# Patient Record
Sex: MALE | Race: WHITE | NOT HISPANIC OR LATINO | Employment: OTHER | URBAN - NONMETROPOLITAN AREA
[De-identification: names, ages, dates, MRNs, and addresses within clinical notes are randomized per-mention and may not be internally consistent; named-entity substitution may affect disease eponyms.]

---

## 2023-11-08 ENCOUNTER — ANCILLARY PROCEDURE (OUTPATIENT)
Dept: RADIOLOGY | Facility: CLINIC | Age: 58
End: 2023-11-08
Payer: COMMERCIAL

## 2023-11-08 ENCOUNTER — OFFICE VISIT (OUTPATIENT)
Dept: UROLOGY | Facility: CLINIC | Age: 58
End: 2023-11-08
Payer: COMMERCIAL

## 2023-11-08 VITALS — RESPIRATION RATE: 16 BRPM | HEIGHT: 74 IN | WEIGHT: 225 LBS | BODY MASS INDEX: 28.88 KG/M2

## 2023-11-08 DIAGNOSIS — R35.1 NOCTURIA: ICD-10-CM

## 2023-11-08 DIAGNOSIS — N20.0 KIDNEY STONES: ICD-10-CM

## 2023-11-08 DIAGNOSIS — N28.1 RENAL CYST: ICD-10-CM

## 2023-11-08 PROCEDURE — 99204 OFFICE O/P NEW MOD 45 MIN: CPT | Performed by: UROLOGY

## 2023-11-08 PROCEDURE — 74018 RADEX ABDOMEN 1 VIEW: CPT | Performed by: RADIOLOGY

## 2023-11-08 PROCEDURE — 74018 RADEX ABDOMEN 1 VIEW: CPT | Mod: FY

## 2023-11-08 PROCEDURE — 1036F TOBACCO NON-USER: CPT | Performed by: UROLOGY

## 2023-11-08 RX ORDER — GLIPIZIDE 2.5 MG/1
2.5 TABLET, EXTENDED RELEASE ORAL
COMMUNITY
End: 2024-01-21 | Stop reason: HOSPADM

## 2023-11-08 RX ORDER — PROPRANOLOL HYDROCHLORIDE 10 MG/1
10 TABLET ORAL 2 TIMES DAILY
COMMUNITY
Start: 2019-10-08

## 2023-11-08 RX ORDER — PANTOPRAZOLE SODIUM 40 MG/1
40 TABLET, DELAYED RELEASE ORAL 2 TIMES DAILY
COMMUNITY

## 2023-11-08 RX ORDER — ATORVASTATIN CALCIUM 20 MG/1
20 TABLET, FILM COATED ORAL DAILY
COMMUNITY
Start: 2019-10-15

## 2023-11-08 RX ORDER — TALC
250 POWDER (GRAM) TOPICAL DAILY
COMMUNITY
Start: 2023-11-04

## 2023-11-08 RX ORDER — METFORMIN HYDROCHLORIDE 500 MG/1
1000 TABLET, EXTENDED RELEASE ORAL
COMMUNITY

## 2023-11-08 ASSESSMENT — ENCOUNTER SYMPTOMS
ENDOCRINE NEGATIVE: 1
CHILLS: 0
NAUSEA: 0
EYES NEGATIVE: 1
SHORTNESS OF BREATH: 0
COUGH: 0
ALLERGIC/IMMUNOLOGIC NEGATIVE: 1
PSYCHIATRIC NEGATIVE: 1
DIFFICULTY URINATING: 0
FEVER: 0

## 2023-11-08 NOTE — PROGRESS NOTES
Subjective   Patient ID: Tj Miranda is a 58 y.o. male.    HPI  Patient is here to establish for kidney stone. Recent abdominal U/S showed multiple left nonobstructive stones and simple left renal cyst. Also showed echogenic mass at the right inferior pole kidney likekly angiomyolipoma or renal lipoma.  He does have dull intermittent left flank pain. Chronic BPH sx are mild and stable. Denies urgency and frequency. Denies dysuria. Denies hematuria. Nocturia x1. No medication for LUT'S. No recent PSA has been done.         Review of Systems   Constitutional:  Negative for chills and fever.   HENT: Negative.     Eyes: Negative.    Respiratory:  Negative for cough and shortness of breath.    Cardiovascular:  Negative for chest pain and leg swelling.   Gastrointestinal:  Negative for nausea.   Endocrine: Negative.    Genitourinary:  Negative for difficulty urinating.        Negative except for documented in HPI   Allergic/Immunologic: Negative.    Neurological:         Alert & oriented X 3   Hematological:         Denies blood thinners   Psychiatric/Behavioral: Negative.         Objective   Physical Exam  Vitals and nursing note reviewed.   Constitutional:       General: He is not in acute distress.     Appearance: Normal appearance.   Pulmonary:      Effort: Pulmonary effort is normal.   Abdominal:      Tenderness: There is no abdominal tenderness.   Genitourinary:     Comments: Kidneys non palpable bilaterally  Bladder non palpable or tender  Scrotum no mass, No hydrocele  Epididymis- No spermatocele. Non Tender.  Testicles: No mass. WNL  Urethra: No discharge  Penis within normal limits... No lesions. No phimosis  Prostate - symmetric, no nodules. BENIGN  Seminal Vesicals: No mass.  Sphincter tone: normal  Neurological:      Mental Status: He is alert.         Assessment/Plan   Diagnoses and all orders for this visit:  Kidney stones  Nocturia  Renal cyst    All available PSA values reviewed, Options discussed.  Questions answered.  PSA ordered   Diet changes for prostate health discussed and educational information given. Pros/Cons of prostate health supplements discussed.   Treatment options for LUTS reviewed  Discussed timed voiding. Discussed fluid and caffeine intake  Treatment options for ED reviewed.  Lifestyle change to help prevent UTIs discussed. Encouraged fluid intake.  U/S reviewed  CT with and Witout IV contrast ordered  BMP ordered  Stone prevention discussed. Diet reviewed. Discussed fluid intake  KUB ordered      F/U with CT and PSA and BMP

## 2023-11-17 ENCOUNTER — LAB (OUTPATIENT)
Dept: LAB | Facility: LAB | Age: 58
End: 2023-11-17
Payer: COMMERCIAL

## 2023-11-17 DIAGNOSIS — N20.0 KIDNEY STONES: ICD-10-CM

## 2023-11-17 DIAGNOSIS — R35.1 NOCTURIA: ICD-10-CM

## 2023-11-17 LAB
CREAT SERPL-MCNC: 0.71 MG/DL (ref 0.5–1.3)
GFR SERPL CREATININE-BSD FRML MDRD: >90 ML/MIN/1.73M*2
PSA SERPL-MCNC: 0.52 NG/ML

## 2023-11-17 PROCEDURE — 84153 ASSAY OF PSA TOTAL: CPT

## 2023-11-17 PROCEDURE — 36415 COLL VENOUS BLD VENIPUNCTURE: CPT

## 2023-11-17 PROCEDURE — 82565 ASSAY OF CREATININE: CPT

## 2023-11-28 ENCOUNTER — HOSPITAL ENCOUNTER (OUTPATIENT)
Dept: RADIOLOGY | Facility: HOSPITAL | Age: 58
Discharge: HOME | End: 2023-11-28
Payer: COMMERCIAL

## 2023-11-28 DIAGNOSIS — N20.0 KIDNEY STONES: ICD-10-CM

## 2023-11-28 PROCEDURE — A9698 NON-RAD CONTRAST MATERIALNOC: HCPCS | Performed by: UROLOGY

## 2023-11-28 PROCEDURE — 74177 CT ABD & PELVIS W/CONTRAST: CPT | Performed by: RADIOLOGY

## 2023-11-28 PROCEDURE — 2550000001 HC RX 255 CONTRASTS: Performed by: UROLOGY

## 2023-11-28 PROCEDURE — 74177 CT ABD & PELVIS W/CONTRAST: CPT

## 2023-11-28 RX ADMIN — IOHEXOL 72 ML: 350 INJECTION, SOLUTION INTRAVENOUS at 15:17

## 2023-11-28 RX ADMIN — IOHEXOL 500 ML: 12 SOLUTION ORAL at 15:18

## 2023-12-04 ASSESSMENT — ENCOUNTER SYMPTOMS
FEVER: 0
CHILLS: 0
ENDOCRINE NEGATIVE: 1
PSYCHIATRIC NEGATIVE: 1
NAUSEA: 0
EYES NEGATIVE: 1
DIFFICULTY URINATING: 0
COUGH: 0
SHORTNESS OF BREATH: 0
ALLERGIC/IMMUNOLOGIC NEGATIVE: 1

## 2023-12-04 NOTE — PROGRESS NOTES
Subjective   Patient ID: Tj Miranda is a 58 y.o. male.    HPI  Patient is here for CT results. CT showed 2.2cm stone Recent abdominal U/S showed multiple left nonobstructive stones and simple left renal cyst. Also showed echogenic mass at the right inferior pole kidney likekly angiomyolipoma or renal lipoma.  He does have dull intermittent left flank pain. Chronic BPH sx are mild and stable. Denies urgency and frequency. Denies dysuria. Denies hematuria. Nocturia x1. No medication for LUT'S. PSA 0.52 (11/23)     Review of Systems   Constitutional:  Negative for chills and fever.   HENT: Negative.     Eyes: Negative.    Respiratory:  Negative for cough and shortness of breath.    Cardiovascular:  Negative for chest pain and leg swelling.   Gastrointestinal:  Negative for nausea.   Endocrine: Negative.    Genitourinary:  Negative for difficulty urinating.        Negative except for documented in HPI   Allergic/Immunologic: Negative.    Neurological:         Alert & oriented X 3   Hematological:         Denies blood thinners   Psychiatric/Behavioral: Negative.         Objective   Physical Exam  Virtual Visit  Assessment/Plan   Diagnoses and all orders for this visit:  Kidney stones  Renal cyst  Nocturia    KUB and CT reviewed  Discussed options for Tx of 2cm renal stone  Pros/cons of PCNL discussed  Also discussed flexible ureteroscopy with laser and ESWL  Stone prevention discussed. Diet reviewed. Discussed fluid intake  All available PSA values reviewed, Options discussed. Questions answered.   Diet changes for prostate health discussed and educational information given.  Pros/Cons of prostate health supplements discussed.     F/U L ESWL WITH FLEXIBLE URETEROSCOPY WITH LASER AND STENT

## 2023-12-06 ENCOUNTER — TELEMEDICINE (OUTPATIENT)
Dept: UROLOGY | Facility: CLINIC | Age: 58
End: 2023-12-06
Payer: COMMERCIAL

## 2023-12-06 DIAGNOSIS — N20.0 KIDNEY STONES: ICD-10-CM

## 2023-12-06 DIAGNOSIS — R35.1 NOCTURIA: ICD-10-CM

## 2023-12-06 DIAGNOSIS — N28.1 RENAL CYST: ICD-10-CM

## 2023-12-06 PROCEDURE — 99214 OFFICE O/P EST MOD 30 MIN: CPT | Performed by: UROLOGY

## 2023-12-11 ENCOUNTER — PREP FOR PROCEDURE (OUTPATIENT)
Dept: UROLOGY | Facility: CLINIC | Age: 58
End: 2023-12-11
Payer: COMMERCIAL

## 2023-12-11 ENCOUNTER — ANESTHESIA EVENT (OUTPATIENT)
Dept: OPERATING ROOM | Facility: HOSPITAL | Age: 58
End: 2023-12-11
Payer: COMMERCIAL

## 2023-12-11 DIAGNOSIS — N20.1 CALCULUS OF URETER: ICD-10-CM

## 2023-12-11 RX ORDER — ONDANSETRON HYDROCHLORIDE 2 MG/ML
4 INJECTION, SOLUTION INTRAVENOUS ONCE AS NEEDED
Status: CANCELLED | OUTPATIENT
Start: 2023-12-11

## 2023-12-11 RX ORDER — HYDROMORPHONE HYDROCHLORIDE 2 MG/ML
0.5 INJECTION, SOLUTION INTRAMUSCULAR; INTRAVENOUS; SUBCUTANEOUS EVERY 5 MIN PRN
Status: CANCELLED | OUTPATIENT
Start: 2023-12-11

## 2023-12-11 RX ORDER — OXYCODONE HYDROCHLORIDE 5 MG/1
5 TABLET ORAL EVERY 4 HOURS PRN
Status: CANCELLED | OUTPATIENT
Start: 2023-12-11

## 2023-12-11 RX ORDER — SODIUM CHLORIDE, SODIUM LACTATE, POTASSIUM CHLORIDE, CALCIUM CHLORIDE 600; 310; 30; 20 MG/100ML; MG/100ML; MG/100ML; MG/100ML
100 INJECTION, SOLUTION INTRAVENOUS CONTINUOUS
Status: CANCELLED | OUTPATIENT
Start: 2023-12-11

## 2023-12-12 ENCOUNTER — HOSPITAL ENCOUNTER (OUTPATIENT)
Facility: HOSPITAL | Age: 58
Setting detail: OUTPATIENT SURGERY
Discharge: HOME | End: 2023-12-12
Attending: UROLOGY | Admitting: UROLOGY
Payer: COMMERCIAL

## 2023-12-12 ENCOUNTER — ANESTHESIA (OUTPATIENT)
Dept: OPERATING ROOM | Facility: HOSPITAL | Age: 58
End: 2023-12-12
Payer: COMMERCIAL

## 2023-12-12 ENCOUNTER — PHARMACY VISIT (OUTPATIENT)
Dept: PHARMACY | Facility: CLINIC | Age: 58
End: 2023-12-12
Payer: COMMERCIAL

## 2023-12-12 VITALS
OXYGEN SATURATION: 98 % | SYSTOLIC BLOOD PRESSURE: 134 MMHG | HEIGHT: 74 IN | TEMPERATURE: 98.2 F | BODY MASS INDEX: 28.75 KG/M2 | HEART RATE: 76 BPM | DIASTOLIC BLOOD PRESSURE: 83 MMHG | RESPIRATION RATE: 16 BRPM | WEIGHT: 223.99 LBS

## 2023-12-12 DIAGNOSIS — N20.1 CALCULUS OF URETER: Primary | ICD-10-CM

## 2023-12-12 LAB — GLUCOSE BLD MANUAL STRIP-MCNC: 160 MG/DL (ref 74–99)

## 2023-12-12 PROCEDURE — 52353 CYSTOURETERO W/LITHOTRIPSY: CPT | Performed by: UROLOGY

## 2023-12-12 PROCEDURE — 3700000001 HC GENERAL ANESTHESIA TIME - INITIAL BASE CHARGE: Performed by: UROLOGY

## 2023-12-12 PROCEDURE — 74420 UROGRAPHY RTRGR +-KUB: CPT | Performed by: UROLOGY

## 2023-12-12 PROCEDURE — 50590 FRAGMENTING OF KIDNEY STONE: CPT | Performed by: UROLOGY

## 2023-12-12 PROCEDURE — RXMED WILLOW AMBULATORY MEDICATION CHARGE

## 2023-12-12 PROCEDURE — 7100000002 HC RECOVERY ROOM TIME - EACH INCREMENTAL 1 MINUTE: Performed by: UROLOGY

## 2023-12-12 PROCEDURE — 7100000010 HC PHASE TWO TIME - EACH INCREMENTAL 1 MINUTE: Performed by: UROLOGY

## 2023-12-12 PROCEDURE — 2720000007 HC OR 272 NO HCPCS: Performed by: UROLOGY

## 2023-12-12 PROCEDURE — 2500000004 HC RX 250 GENERAL PHARMACY W/ HCPCS (ALT 636 FOR OP/ED): Performed by: ANESTHESIOLOGY

## 2023-12-12 PROCEDURE — 7100000009 HC PHASE TWO TIME - INITIAL BASE CHARGE: Performed by: UROLOGY

## 2023-12-12 PROCEDURE — 2550000001 HC RX 255 CONTRASTS: Performed by: UROLOGY

## 2023-12-12 PROCEDURE — 52356 CYSTO/URETERO W/LITHOTRIPSY: CPT | Performed by: UROLOGY

## 2023-12-12 PROCEDURE — 2780000003 HC OR 278 NO HCPCS: Performed by: UROLOGY

## 2023-12-12 PROCEDURE — 7100000001 HC RECOVERY ROOM TIME - INITIAL BASE CHARGE: Performed by: UROLOGY

## 2023-12-12 PROCEDURE — C1769 GUIDE WIRE: HCPCS | Performed by: UROLOGY

## 2023-12-12 PROCEDURE — 2500000005 HC RX 250 GENERAL PHARMACY W/O HCPCS: Performed by: ANESTHESIOLOGY

## 2023-12-12 PROCEDURE — 3700000002 HC GENERAL ANESTHESIA TIME - EACH INCREMENTAL 1 MINUTE: Performed by: UROLOGY

## 2023-12-12 PROCEDURE — 82947 ASSAY GLUCOSE BLOOD QUANT: CPT

## 2023-12-12 DEVICE — URETERAL STENT
Type: IMPLANTABLE DEVICE | Site: URETER | Status: FUNCTIONAL
Brand: POLARIS™ ULTRA

## 2023-12-12 RX ORDER — FAMOTIDINE 10 MG/ML
20 INJECTION INTRAVENOUS ONCE
Status: COMPLETED | OUTPATIENT
Start: 2023-12-12 | End: 2023-12-12

## 2023-12-12 RX ORDER — SODIUM CHLORIDE, SODIUM LACTATE, POTASSIUM CHLORIDE, CALCIUM CHLORIDE 600; 310; 30; 20 MG/100ML; MG/100ML; MG/100ML; MG/100ML
75 INJECTION, SOLUTION INTRAVENOUS CONTINUOUS
Status: DISCONTINUED | OUTPATIENT
Start: 2023-12-12 | End: 2023-12-12 | Stop reason: HOSPADM

## 2023-12-12 RX ORDER — PROPOFOL 10 MG/ML
INJECTION, EMULSION INTRAVENOUS AS NEEDED
Status: DISCONTINUED | OUTPATIENT
Start: 2023-12-12 | End: 2023-12-12

## 2023-12-12 RX ORDER — DEXAMETHASONE SODIUM PHOSPHATE 10 MG/ML
6 INJECTION INTRAMUSCULAR; INTRAVENOUS ONCE
Status: COMPLETED | OUTPATIENT
Start: 2023-12-12 | End: 2023-12-12

## 2023-12-12 RX ORDER — ACETAMINOPHEN 325 MG/1
975 TABLET ORAL ONCE
Status: COMPLETED | OUTPATIENT
Start: 2023-12-12 | End: 2023-12-12

## 2023-12-12 RX ORDER — HYDROCODONE BITARTRATE AND ACETAMINOPHEN 5; 325 MG/1; MG/1
1 TABLET ORAL EVERY 6 HOURS PRN
Qty: 20 TABLET | Refills: 0 | Status: SHIPPED | OUTPATIENT
Start: 2023-12-12 | End: 2024-01-16 | Stop reason: WASHOUT

## 2023-12-12 RX ORDER — CIPROFLOXACIN 250 MG/1
250 TABLET, FILM COATED ORAL 2 TIMES DAILY
Qty: 6 TABLET | Refills: 0 | Status: SHIPPED | OUTPATIENT
Start: 2023-12-12 | End: 2024-01-16 | Stop reason: WASHOUT

## 2023-12-12 RX ORDER — PHENAZOPYRIDINE HYDROCHLORIDE 200 MG/1
200 TABLET, FILM COATED ORAL 3 TIMES DAILY PRN
Qty: 30 TABLET | Refills: 0 | Status: ON HOLD | OUTPATIENT
Start: 2023-12-12 | End: 2024-01-19 | Stop reason: SDUPTHER

## 2023-12-12 RX ORDER — LIDOCAINE HYDROCHLORIDE 10 MG/ML
INJECTION, SOLUTION EPIDURAL; INFILTRATION; INTRACAUDAL; PERINEURAL AS NEEDED
Status: DISCONTINUED | OUTPATIENT
Start: 2023-12-12 | End: 2023-12-12

## 2023-12-12 RX ORDER — FENTANYL CITRATE 50 UG/ML
INJECTION, SOLUTION INTRAMUSCULAR; INTRAVENOUS AS NEEDED
Status: DISCONTINUED | OUTPATIENT
Start: 2023-12-12 | End: 2023-12-12

## 2023-12-12 RX ORDER — NORETHINDRONE AND ETHINYL ESTRADIOL 0.5-0.035
KIT ORAL AS NEEDED
Status: DISCONTINUED | OUTPATIENT
Start: 2023-12-12 | End: 2023-12-12

## 2023-12-12 RX ORDER — MIDAZOLAM HYDROCHLORIDE 1 MG/ML
2 INJECTION INTRAMUSCULAR; INTRAVENOUS ONCE
Status: COMPLETED | OUTPATIENT
Start: 2023-12-12 | End: 2023-12-12

## 2023-12-12 RX ORDER — ONDANSETRON HYDROCHLORIDE 2 MG/ML
4 INJECTION, SOLUTION INTRAVENOUS ONCE
Status: COMPLETED | OUTPATIENT
Start: 2023-12-12 | End: 2023-12-12

## 2023-12-12 RX ADMIN — ONDANSETRON 4 MG: 2 INJECTION INTRAMUSCULAR; INTRAVENOUS at 13:36

## 2023-12-12 RX ADMIN — PROPOFOL 180 MG: 10 INJECTION, EMULSION INTRAVENOUS at 14:51

## 2023-12-12 RX ADMIN — PROPOFOL 20 MG: 10 INJECTION, EMULSION INTRAVENOUS at 15:05

## 2023-12-12 RX ADMIN — MIDAZOLAM HYDROCHLORIDE 2 MG: 1 INJECTION, SOLUTION INTRAMUSCULAR; INTRAVENOUS at 13:35

## 2023-12-12 RX ADMIN — FENTANYL CITRATE 100 MCG: 50 INJECTION, SOLUTION INTRAMUSCULAR; INTRAVENOUS at 14:49

## 2023-12-12 RX ADMIN — SODIUM CHLORIDE, POTASSIUM CHLORIDE, SODIUM LACTATE AND CALCIUM CHLORIDE 75 ML/HR: 600; 310; 30; 20 INJECTION, SOLUTION INTRAVENOUS at 13:34

## 2023-12-12 RX ADMIN — EPHEDRINE SULFATE 15 MG: 50 INJECTION, SOLUTION INTRAVENOUS at 14:57

## 2023-12-12 RX ADMIN — LIDOCAINE HYDROCHLORIDE 30 MG: 10 INJECTION, SOLUTION EPIDURAL; INFILTRATION; INTRACAUDAL; PERINEURAL at 14:51

## 2023-12-12 RX ADMIN — Medication 20 MG: at 14:52

## 2023-12-12 RX ADMIN — FAMOTIDINE 20 MG: 10 INJECTION, SOLUTION INTRAVENOUS at 13:37

## 2023-12-12 RX ADMIN — DEXAMETHASONE SODIUM PHOSPHATE 6 MG: 10 INJECTION, SOLUTION INTRAMUSCULAR; INTRAVENOUS at 13:38

## 2023-12-12 RX ADMIN — ACETAMINOPHEN 975 MG: 325 TABLET ORAL at 13:34

## 2023-12-12 SDOH — HEALTH STABILITY: MENTAL HEALTH: CURRENT SMOKER: 0

## 2023-12-12 ASSESSMENT — ENCOUNTER SYMPTOMS
EYES NEGATIVE: 1
COUGH: 0
SHORTNESS OF BREATH: 0
ENDOCRINE NEGATIVE: 1
CHILLS: 0
DIFFICULTY URINATING: 0
PSYCHIATRIC NEGATIVE: 1
ALLERGIC/IMMUNOLOGIC NEGATIVE: 1
NAUSEA: 0
FEVER: 0

## 2023-12-12 ASSESSMENT — PAIN SCALES - GENERAL
PAINLEVEL_OUTOF10: 0 - NO PAIN
PAIN_LEVEL: 0

## 2023-12-12 ASSESSMENT — PAIN - FUNCTIONAL ASSESSMENT
PAIN_FUNCTIONAL_ASSESSMENT: 0-10

## 2023-12-12 NOTE — ANESTHESIA PROCEDURE NOTES
Airway  Date/Time: 12/12/2023 2:53 PM  Urgency: elective    Airway not difficult    Staffing  Performed: attending   Authorized by: Silvestre Cleary DO    Performed by: Silvestre Cleary DO  Patient location during procedure: OR    Indications and Patient Condition  Indications for airway management: anesthesia  Spontaneous Ventilation: absent  Sedation level: deep  Preoxygenated: yes  Patient position: sniffing  Mask difficulty assessment: 1 - vent by mask    Final Airway Details  Final airway type: supraglottic airway      Successful airway: Supreme  Size 5     Number of attempts at approach: 1    Additional Comments  Pre-O2.  Monitors.  Smooth IV induction.  Easy mask Vent with Oral Airway.  Easy atraumatic placement of LMA. Clear airway.

## 2023-12-12 NOTE — OP NOTE
Lithotripsy Extracorporeal Shock Wave (L), Cystoscopy left RPG Left ureteroscopy  with Lithotripsy Laser and left stent (L) Operative Note     Date: 2023  OR Location: [facility] OR    Name: Tj Miranda : 1965, Age: 58 y.o., MRN: [id number], Sex: male    Diagnosis  Pre-op Diagnosis     * Calculus of ureter [N20.1] Post-op Diagnosis     * Calculus of ureter [N20.1]     Procedures  Lithotripsy Extracorporeal Shock Wave  07582 - [place] LITHOTRIPSY XTRCORP SHOCK WAVE    Cystoscopy left RPG Left ureteroscopy  with Lithotripsy Laser and left stent  14361 - [place] CYSTO/URETERO W/LITHOTRIPSY &INDWELL STENT INSRT      Surgeons      * Andrew Chacon - Primary    Resident/Fellow/Other Assistant:  Surgeon(s) and Role:    Procedure Summary  Anesthesia: General  ASA: II  Anesthesia Staff: Anesthesiologist: Silvestre Cleary DO  Estimated Blood Loss: 0mL  Intra-op Medications: * No intraprocedure medications in log *           Anesthesia Record               Intraprocedure I/O Totals          Intake    Ketamine 0.00 mL    The total shown is the total volume documented since Anesthesia Start was filed.    Total Intake 0 mL          Specimen: No specimens collected     Staff:   Circulator: Janis Thornton RN  Scrub Person: Apollo Strange RN                 Implants:  Implants       Type Name Action Serial No.      Shunt STENT, POLARIS ULTRA  5FR 24CM, DISPOSABLE - [id number] Implanted                 Indications: Tj Miranda is an 58 y.o. male who is having surgery for Calculus of ureter [N20.1].     The patient was seen in the preoperative area. The risks, benefits, complications, treatment options, non-operative alternatives, expected recovery and outcomes were discussed with the patient. The possibilities of reaction to medication, pulmonary aspiration, injury to surrounding structures, bleeding, recurrent infection, the need for additional procedures, failure to diagnose a condition, and creating a complication requiring  transfusion or operation were discussed with the patient. The patient concurred with the proposed plan, giving informed consent.  The site of surgery was properly noted/marked if necessary per policy. The patient has been actively warmed in preoperative area.     Preoperative diagnosis: Left ureteral stone  Postoperative diagnosis: The same  Physician: Oswaldo  Procedure: Cystoscopy with Left RPG, Left ureteroscopy with holmium and stent placement, Left ESWL  ESTIMATED BLOOD LOSS: Minimal.    COMPLICATIONS: None.    INDICATIONS AND CONSENT:  After the risks, benefits, alternatives and indications of this procedure were explained to the patient consented.    PROCEDURE:   The patient was brought to the operating room, placed on the table in supine position.  After adequate anesthesia was obtained, the patient was prepped and draped in the standard surgical fashion.  First, a 21 Khmer cystoscope was inserted into the bladder, and formal cystoscopy was performed. A guidewire was placed up the ureter into the renal pelvis using fluoroscopic guidance . A left retrograde pyelogram suggested a filling defect in the ureter. The ureteroscope was taken up to the level of the stone. The stone was visualized and Holmium laser was used to break up the stone. After removing all of the stone pieces, we then shot a retrograde pyelogram which did not show any obvious filling defects or additional stones in the ureter.  Let ESWL performed, 2500 shocks delivered.     The ureteroscope was removed and a 5 x 24 double-J stent was placed under direct fluoroscopic vision.  The patient tolerated the procedure well and there were no complications.       Follow up 2-3 weeks with KUB    Attending Attestation: I was present and scrubbed for the entire procedure.    Andrew Chacon  Phone Number: [phone]

## 2023-12-12 NOTE — ANESTHESIA PREPROCEDURE EVALUATION
Patient: Tj Miranda    Procedure Information       Date/Time: 12/12/23 1430    Procedures:       Lithotripsy Extracorporeal Shock Wave (Left)      Cystoscopy left RPG Left ureteroscopy  with Lithotripsy Laser and left stent (Left)    Location: [facility] OR 05 / [facility] OR    Surgeons: Andrew Chacon MD            Relevant Problems   Anesthesia (within normal limits)   No history of complications History of anesthesia complications      Cardiovascular (within normal limits)      Endocrine (within normal limits)      GI (within normal limits)      /Renal   (+) Kidney stones   (+) Renal cyst      Neuro/Psych (within normal limits)      Pulmonary (within normal limits)      GI/Hepatic (within normal limits)      Hematology (within normal limits)      Musculoskeletal (within normal limits)      Eyes, Ears, Nose, and Throat (within normal limits)      Infectious Disease (within normal limits)       Clinical information reviewed:   Tobacco   Meds   Med Hx  Surg Hx   Fam Hx  Soc Hx        NPO Detail:  NPO/Void Status  Carbonhydrate Drink Given Prior to Surgery? : N  Date of Last Liquid: 12/11/23  Time of Last Liquid: 2200  Date of Last Solid: 12/11/23  Time of Last Solid: 2200  Last Intake Type: Clear fluids  Time of Last Void: 1309         Physical Exam    Airway  TM distance: >3 FB  Neck ROM: full     Cardiovascular   Rhythm: regular  Rate: normal  Comments: Occ PVC noted.  Unifocal.   Dental   (+) lower dentures  Comments: Upper partial, lower denture removed.   Pulmonary   Breath sounds clear to auscultation     Abdominal            Anesthesia Plan    ASA 2     general     The patient is not a current smoker.    intravenous induction   Anesthetic plan and risks discussed with patient.    GA discussed with Patient.  Plan and process discussed for anesthesia for procedure.  Risks and benefits discussed.  All questions answered with patient.  The patient understands plan and wishes to proceed.

## 2023-12-12 NOTE — ANESTHESIA POSTPROCEDURE EVALUATION
Patient: Tj Miranda    Procedure Summary       Date: 12/12/23 Room / Location: [facility] OR 05 / [facility] OR    Anesthesia Start: 1446 Anesthesia Stop: 1538    Procedures:       Lithotripsy Extracorporeal Shock Wave (Left)      Cystoscopy left RPG Left ureteroscopy  with Lithotripsy Laser and left stent (Left) Diagnosis:       Calculus of ureter      (Calculus of ureter [N20.1])    Surgeons: Andrew Chacon MD Responsible Provider: Silvestre Cleary DO    Anesthesia Type: general ASA Status: 2            Anesthesia Type: general    Vitals Value Taken Time   /66 12/12/23 1539   Temp 98.5 12/12/23 1539   Pulse 74 12/12/23 1539   Resp 16 12/12/23 1539   SpO2 100 12/12/23 1539       Anesthesia Post Evaluation    Patient location during evaluation: bedside  Patient participation: complete - patient participated  Level of consciousness: awake  Pain score: 0  Pain management: adequate  Multimodal analgesia pain management approach  Airway patency: patent  Cardiovascular status: acceptable  Respiratory status: acceptable and face mask  Hydration status: acceptable  Postoperative Nausea and Vomiting: none  Comments: Easily awakens.  VSS.  Denies pain or nausea.      Oral airway removed on entry to PACU.  VSS.  Denies pain or nausea.   No notable events documented.

## 2023-12-12 NOTE — H&P
History Of Present Illness  Tj Miranda is a 58 y.o. male presenting with  ureteral stone.     Past Medical History  No past medical history on file.    Surgical History  No past surgical history on file.     Social History  He reports that he has never smoked. He has never used smokeless tobacco. No history on file for alcohol use and drug use.    Family History  Family History   Problem Relation Name Age of Onset    No Known Problems Father      No Known Problems Mother          Allergies  Patient has no known allergies.    Review of Systems   Constitutional:  Negative for chills and fever.   HENT: Negative.     Eyes: Negative.    Respiratory:  Negative for cough and shortness of breath.    Cardiovascular:  Negative for chest pain and leg swelling.   Gastrointestinal:  Negative for nausea.   Endocrine: Negative.    Genitourinary:  Negative for difficulty urinating.        Negative except for documented in HPI   Allergic/Immunologic: Negative.    Neurological:         Alert & oriented X 3   Hematological:         Denies blood thinners   Psychiatric/Behavioral: Negative.          Physical Exam  Vitals and nursing note reviewed.   Pulmonary:      Effort: Pulmonary effort is normal.      Breath sounds: Normal breath sounds.   Abdominal:      Palpations: Abdomen is soft.      Tenderness: There is no abdominal tenderness.   Genitourinary:     Comments: Kidneys non palpable bilaterally  Bladder non palpable or tender  Neurological:      Mental Status: He is alert.          Last Recorded Vitals  There were no vitals taken for this visit.         Assessment/Plan   Principal Problem:    Calculus of ureter          Andrew Chacon MD

## 2024-01-03 ENCOUNTER — ANCILLARY PROCEDURE (OUTPATIENT)
Dept: RADIOLOGY | Facility: CLINIC | Age: 59
End: 2024-01-03
Payer: COMMERCIAL

## 2024-01-03 ENCOUNTER — PROCEDURE VISIT (OUTPATIENT)
Dept: UROLOGY | Facility: CLINIC | Age: 59
End: 2024-01-03
Payer: COMMERCIAL

## 2024-01-03 DIAGNOSIS — N20.1 LEFT URETERAL STONE: ICD-10-CM

## 2024-01-03 DIAGNOSIS — N20.1 LEFT URETERAL STONE: Primary | ICD-10-CM

## 2024-01-03 PROCEDURE — 74018 RADEX ABDOMEN 1 VIEW: CPT | Performed by: RADIOLOGY

## 2024-01-03 PROCEDURE — 99024 POSTOP FOLLOW-UP VISIT: CPT | Performed by: UROLOGY

## 2024-01-03 PROCEDURE — 74018 RADEX ABDOMEN 1 VIEW: CPT

## 2024-01-03 NOTE — PROGRESS NOTES
Subjective   Patient ID: Tj Miranda is a 58 y.o. male who presents for STENT REMOVAL-LEFT URETERAL STONE.  HPI    Review of Systems    Objective   Physical Exam    Assessment/Plan          TOO MUCH STONE BURDEN TO REMOVE STENT  WILL PLAN REPEAT FLEXIBLE URETEROSOCPY WITH LASER AND REPEAT ESWL       Yue Navarrete MA 01/03/24 2:59 PM

## 2024-01-08 ENCOUNTER — PREP FOR PROCEDURE (OUTPATIENT)
Dept: UROLOGY | Facility: CLINIC | Age: 59
End: 2024-01-08
Payer: COMMERCIAL

## 2024-01-08 DIAGNOSIS — N20.1 CALCULUS OF URETER: ICD-10-CM

## 2024-01-16 NOTE — PREPROCEDURE INSTRUCTIONS
No outpatient medications have been marked as taking for the 1/19/24 encounter (Hospital Encounter).                       NPO Instructions:    Nothing to eat or drink after midnight    Additional Instructions:     Will need  home. Will receive call day before surgery with arrival time

## 2024-01-18 ENCOUNTER — ANESTHESIA EVENT (OUTPATIENT)
Dept: OPERATING ROOM | Facility: HOSPITAL | Age: 59
DRG: 908 | End: 2024-01-18
Payer: COMMERCIAL

## 2024-01-19 ENCOUNTER — APPOINTMENT (OUTPATIENT)
Dept: RADIOLOGY | Facility: HOSPITAL | Age: 59
DRG: 908 | End: 2024-01-19
Payer: COMMERCIAL

## 2024-01-19 ENCOUNTER — PHARMACY VISIT (OUTPATIENT)
Dept: PHARMACY | Facility: CLINIC | Age: 59
End: 2024-01-19
Payer: COMMERCIAL

## 2024-01-19 ENCOUNTER — HOSPITAL ENCOUNTER (OUTPATIENT)
Facility: HOSPITAL | Age: 59
Setting detail: OUTPATIENT SURGERY
Discharge: HOME | DRG: 908 | End: 2024-01-19
Attending: UROLOGY | Admitting: UROLOGY
Payer: COMMERCIAL

## 2024-01-19 ENCOUNTER — ANESTHESIA (OUTPATIENT)
Dept: OPERATING ROOM | Facility: HOSPITAL | Age: 59
DRG: 908 | End: 2024-01-19
Payer: COMMERCIAL

## 2024-01-19 ENCOUNTER — HOSPITAL ENCOUNTER (INPATIENT)
Facility: HOSPITAL | Age: 59
LOS: 2 days | Discharge: HOME | DRG: 908 | End: 2024-01-21
Attending: INTERNAL MEDICINE | Admitting: INTERNAL MEDICINE
Payer: COMMERCIAL

## 2024-01-19 VITALS
RESPIRATION RATE: 14 BRPM | HEART RATE: 69 BPM | TEMPERATURE: 97.3 F | SYSTOLIC BLOOD PRESSURE: 122 MMHG | HEIGHT: 74 IN | DIASTOLIC BLOOD PRESSURE: 84 MMHG | WEIGHT: 228.62 LBS | BODY MASS INDEX: 29.34 KG/M2 | OXYGEN SATURATION: 100 %

## 2024-01-19 DIAGNOSIS — S37.012A: Primary | ICD-10-CM

## 2024-01-19 DIAGNOSIS — N17.9 AKI (ACUTE KIDNEY INJURY) (CMS-HCC): ICD-10-CM

## 2024-01-19 DIAGNOSIS — N20.1 CALCULUS OF URETER: ICD-10-CM

## 2024-01-19 DIAGNOSIS — R73.9 HYPERGLYCEMIA: ICD-10-CM

## 2024-01-19 DIAGNOSIS — R31.9 URINARY TRACT INFECTION WITH HEMATURIA, SITE UNSPECIFIED: ICD-10-CM

## 2024-01-19 DIAGNOSIS — E87.5 HYPERKALEMIA: ICD-10-CM

## 2024-01-19 DIAGNOSIS — N39.0 URINARY TRACT INFECTION WITH HEMATURIA, SITE UNSPECIFIED: ICD-10-CM

## 2024-01-19 DIAGNOSIS — N20.0 KIDNEY STONES: Primary | ICD-10-CM

## 2024-01-19 PROBLEM — K74.60 CIRRHOSIS (MULTI): Status: ACTIVE | Noted: 2024-01-19

## 2024-01-19 LAB
ALBUMIN SERPL BCP-MCNC: 3.7 G/DL (ref 3.4–5)
ALP SERPL-CCNC: 80 U/L (ref 33–136)
ALT SERPL W P-5'-P-CCNC: 23 U/L (ref 10–52)
ANION GAP SERPL CALC-SCNC: 15 MMOL/L (ref 10–20)
APPEARANCE UR: ABNORMAL
AST SERPL W P-5'-P-CCNC: 26 U/L (ref 9–39)
BILIRUB SERPL-MCNC: 2.6 MG/DL (ref 0–1.2)
BILIRUB UR STRIP.AUTO-MCNC: NEGATIVE MG/DL
BUN SERPL-MCNC: 24 MG/DL (ref 6–23)
CALCIUM SERPL-MCNC: 9.1 MG/DL (ref 8.6–10.3)
CARDIAC TROPONIN I PNL SERPL HS: 3 NG/L (ref 0–20)
CHLORIDE SERPL-SCNC: 99 MMOL/L (ref 98–107)
CO2 SERPL-SCNC: 25 MMOL/L (ref 21–32)
COLOR UR: ABNORMAL
CREAT SERPL-MCNC: 1.32 MG/DL (ref 0.5–1.3)
EGFRCR SERPLBLD CKD-EPI 2021: 62 ML/MIN/1.73M*2
ERYTHROCYTE [DISTWIDTH] IN BLOOD BY AUTOMATED COUNT: 13.3 % (ref 11.5–14.5)
GLUCOSE BLD MANUAL STRIP-MCNC: 186 MG/DL (ref 74–99)
GLUCOSE BLD MANUAL STRIP-MCNC: 396 MG/DL (ref 74–99)
GLUCOSE SERPL-MCNC: 414 MG/DL (ref 74–99)
GLUCOSE UR STRIP.AUTO-MCNC: ABNORMAL MG/DL
HCT VFR BLD AUTO: 37.5 % (ref 41–52)
HGB BLD-MCNC: 13.2 G/DL (ref 13.5–17.5)
KETONES UR STRIP.AUTO-MCNC: ABNORMAL MG/DL
LEUKOCYTE ESTERASE UR QL STRIP.AUTO: NEGATIVE
MCH RBC QN AUTO: 32.6 PG (ref 26–34)
MCHC RBC AUTO-ENTMCNC: 35.2 G/DL (ref 32–36)
MCV RBC AUTO: 93 FL (ref 80–100)
MUCOUS THREADS #/AREA URNS AUTO: ABNORMAL /LPF
NITRITE UR QL STRIP.AUTO: POSITIVE
NRBC BLD-RTO: 0 /100 WBCS (ref 0–0)
PH UR STRIP.AUTO: 5 [PH]
PLATELET # BLD AUTO: 61 X10*3/UL (ref 150–450)
POTASSIUM SERPL-SCNC: 5.9 MMOL/L (ref 3.5–5.3)
PROT SERPL-MCNC: 6.8 G/DL (ref 6.4–8.2)
PROT UR STRIP.AUTO-MCNC: ABNORMAL MG/DL
RBC # BLD AUTO: 4.05 X10*6/UL (ref 4.5–5.9)
RBC # UR STRIP.AUTO: ABNORMAL /UL
RBC #/AREA URNS AUTO: >20 /HPF
SODIUM SERPL-SCNC: 133 MMOL/L (ref 136–145)
SP GR UR STRIP.AUTO: 1.03
UROBILINOGEN UR STRIP.AUTO-MCNC: 2 MG/DL
WBC # BLD AUTO: 4.9 X10*3/UL (ref 4.4–11.3)
WBC #/AREA URNS AUTO: ABNORMAL /HPF

## 2024-01-19 PROCEDURE — 94640 AIRWAY INHALATION TREATMENT: CPT

## 2024-01-19 PROCEDURE — 52353 CYSTOURETERO W/LITHOTRIPSY: CPT | Performed by: UROLOGY

## 2024-01-19 PROCEDURE — 7100000002 HC RECOVERY ROOM TIME - EACH INCREMENTAL 1 MINUTE: Performed by: UROLOGY

## 2024-01-19 PROCEDURE — C1769 GUIDE WIRE: HCPCS | Performed by: UROLOGY

## 2024-01-19 PROCEDURE — 81001 URINALYSIS AUTO W/SCOPE: CPT | Performed by: PHYSICIAN ASSISTANT

## 2024-01-19 PROCEDURE — 2500000004 HC RX 250 GENERAL PHARMACY W/ HCPCS (ALT 636 FOR OP/ED): Performed by: ANESTHESIOLOGY

## 2024-01-19 PROCEDURE — 99223 1ST HOSP IP/OBS HIGH 75: CPT | Performed by: INTERNAL MEDICINE

## 2024-01-19 PROCEDURE — 0TC78ZZ EXTIRPATION OF MATTER FROM LEFT URETER, VIA NATURAL OR ARTIFICIAL OPENING ENDOSCOPIC: ICD-10-PCS | Performed by: UROLOGY

## 2024-01-19 PROCEDURE — 94664 DEMO&/EVAL PT USE INHALER: CPT

## 2024-01-19 PROCEDURE — 84484 ASSAY OF TROPONIN QUANT: CPT | Performed by: PHYSICIAN ASSISTANT

## 2024-01-19 PROCEDURE — 7100000001 HC RECOVERY ROOM TIME - INITIAL BASE CHARGE: Performed by: UROLOGY

## 2024-01-19 PROCEDURE — 2500000002 HC RX 250 W HCPCS SELF ADMINISTERED DRUGS (ALT 637 FOR MEDICARE OP, ALT 636 FOR OP/ED): Performed by: PHYSICIAN ASSISTANT

## 2024-01-19 PROCEDURE — 0T778DZ DILATION OF LEFT URETER WITH INTRALUMINAL DEVICE, VIA NATURAL OR ARTIFICIAL OPENING ENDOSCOPIC: ICD-10-PCS | Performed by: UROLOGY

## 2024-01-19 PROCEDURE — 50590 FRAGMENTING OF KIDNEY STONE: CPT | Performed by: UROLOGY

## 2024-01-19 PROCEDURE — 85027 COMPLETE CBC AUTOMATED: CPT | Performed by: PHYSICIAN ASSISTANT

## 2024-01-19 PROCEDURE — RXMED WILLOW AMBULATORY MEDICATION CHARGE

## 2024-01-19 PROCEDURE — 2720000007 HC OR 272 NO HCPCS: Performed by: UROLOGY

## 2024-01-19 PROCEDURE — 82947 ASSAY GLUCOSE BLOOD QUANT: CPT

## 2024-01-19 PROCEDURE — 3700000002 HC GENERAL ANESTHESIA TIME - EACH INCREMENTAL 1 MINUTE: Performed by: UROLOGY

## 2024-01-19 PROCEDURE — 2780000003 HC OR 278 NO HCPCS: Performed by: UROLOGY

## 2024-01-19 PROCEDURE — 7100000009 HC PHASE TWO TIME - INITIAL BASE CHARGE: Performed by: UROLOGY

## 2024-01-19 PROCEDURE — BT1FYZZ FLUOROSCOPY OF LEFT KIDNEY, URETER AND BLADDER USING OTHER CONTRAST: ICD-10-PCS | Performed by: UROLOGY

## 2024-01-19 PROCEDURE — 3700000001 HC GENERAL ANESTHESIA TIME - INITIAL BASE CHARGE: Performed by: UROLOGY

## 2024-01-19 PROCEDURE — 52356 CYSTO/URETERO W/LITHOTRIPSY: CPT | Performed by: UROLOGY

## 2024-01-19 PROCEDURE — 2500000005 HC RX 250 GENERAL PHARMACY W/O HCPCS: Performed by: PHYSICIAN ASSISTANT

## 2024-01-19 PROCEDURE — 74176 CT ABD & PELVIS W/O CONTRAST: CPT | Performed by: STUDENT IN AN ORGANIZED HEALTH CARE EDUCATION/TRAINING PROGRAM

## 2024-01-19 PROCEDURE — 96375 TX/PRO/DX INJ NEW DRUG ADDON: CPT

## 2024-01-19 PROCEDURE — 1200000002 HC GENERAL ROOM WITH TELEMETRY DAILY

## 2024-01-19 PROCEDURE — 2550000001 HC RX 255 CONTRASTS: Performed by: UROLOGY

## 2024-01-19 PROCEDURE — 74176 CT ABD & PELVIS W/O CONTRAST: CPT

## 2024-01-19 PROCEDURE — 96365 THER/PROPH/DIAG IV INF INIT: CPT

## 2024-01-19 PROCEDURE — 80053 COMPREHEN METABOLIC PANEL: CPT | Performed by: PHYSICIAN ASSISTANT

## 2024-01-19 PROCEDURE — 2500000004 HC RX 250 GENERAL PHARMACY W/ HCPCS (ALT 636 FOR OP/ED): Performed by: PHYSICIAN ASSISTANT

## 2024-01-19 PROCEDURE — 9420000001 HC RT PATIENT EDUCATION 5 MIN

## 2024-01-19 PROCEDURE — 36415 COLL VENOUS BLD VENIPUNCTURE: CPT | Performed by: PHYSICIAN ASSISTANT

## 2024-01-19 PROCEDURE — 99285 EMERGENCY DEPT VISIT HI MDM: CPT

## 2024-01-19 PROCEDURE — 2500000005 HC RX 250 GENERAL PHARMACY W/O HCPCS: Performed by: ANESTHESIOLOGY

## 2024-01-19 PROCEDURE — 7100000010 HC PHASE TWO TIME - EACH INCREMENTAL 1 MINUTE: Performed by: UROLOGY

## 2024-01-19 PROCEDURE — 87086 URINE CULTURE/COLONY COUNT: CPT | Mod: SAMLAB | Performed by: PHYSICIAN ASSISTANT

## 2024-01-19 PROCEDURE — 96361 HYDRATE IV INFUSION ADD-ON: CPT

## 2024-01-19 PROCEDURE — 74420 UROGRAPHY RTRGR +-KUB: CPT | Performed by: UROLOGY

## 2024-01-19 DEVICE — STENT, POLARIS ULTRA  5FR 24CM, DISPOSABLE: Type: IMPLANTABLE DEVICE | Site: URETER | Status: FUNCTIONAL

## 2024-01-19 RX ORDER — HYDROMORPHONE HYDROCHLORIDE 2 MG/ML
0.5 INJECTION, SOLUTION INTRAMUSCULAR; INTRAVENOUS; SUBCUTANEOUS EVERY 5 MIN PRN
Status: DISCONTINUED | OUTPATIENT
Start: 2024-01-19 | End: 2024-01-19 | Stop reason: HOSPADM

## 2024-01-19 RX ORDER — DEXTROSE MONOHYDRATE 100 MG/ML
0.3 INJECTION, SOLUTION INTRAVENOUS ONCE AS NEEDED
Status: DISCONTINUED | OUTPATIENT
Start: 2024-01-19 | End: 2024-01-21 | Stop reason: HOSPADM

## 2024-01-19 RX ORDER — DEXTROSE 50 % IN WATER (D50W) INTRAVENOUS SYRINGE
12.5 ONCE
Status: COMPLETED | OUTPATIENT
Start: 2024-01-19 | End: 2024-01-19

## 2024-01-19 RX ORDER — DEXAMETHASONE SODIUM PHOSPHATE 10 MG/ML
6 INJECTION INTRAMUSCULAR; INTRAVENOUS ONCE
Status: COMPLETED | OUTPATIENT
Start: 2024-01-19 | End: 2024-01-19

## 2024-01-19 RX ORDER — ALBUTEROL SULFATE 5 MG/ML
10 SOLUTION RESPIRATORY (INHALATION) ONCE
Status: COMPLETED | OUTPATIENT
Start: 2024-01-19 | End: 2024-01-19

## 2024-01-19 RX ORDER — OXYCODONE HYDROCHLORIDE 5 MG/1
5 TABLET ORAL EVERY 4 HOURS PRN
Status: DISCONTINUED | OUTPATIENT
Start: 2024-01-19 | End: 2024-01-19 | Stop reason: HOSPADM

## 2024-01-19 RX ORDER — CEFTRIAXONE 1 G/50ML
1 INJECTION, SOLUTION INTRAVENOUS ONCE
Status: COMPLETED | OUTPATIENT
Start: 2024-01-19 | End: 2024-01-19

## 2024-01-19 RX ORDER — DEXTROSE 50 % IN WATER (D50W) INTRAVENOUS SYRINGE
25
Status: DISCONTINUED | OUTPATIENT
Start: 2024-01-19 | End: 2024-01-21 | Stop reason: HOSPADM

## 2024-01-19 RX ORDER — CIPROFLOXACIN 250 MG/1
250 TABLET, FILM COATED ORAL 2 TIMES DAILY
Qty: 6 TABLET | Refills: 0 | Status: ON HOLD | OUTPATIENT
Start: 2024-01-19 | End: 2024-03-19 | Stop reason: ALTCHOICE

## 2024-01-19 RX ORDER — LIDOCAINE HYDROCHLORIDE 20 MG/ML
INJECTION, SOLUTION INFILTRATION; PERINEURAL AS NEEDED
Status: DISCONTINUED | OUTPATIENT
Start: 2024-01-19 | End: 2024-01-19

## 2024-01-19 RX ORDER — ONDANSETRON HYDROCHLORIDE 2 MG/ML
4 INJECTION, SOLUTION INTRAVENOUS ONCE
Status: COMPLETED | OUTPATIENT
Start: 2024-01-19 | End: 2024-01-19

## 2024-01-19 RX ORDER — PHENYLEPHRINE HYDROCHLORIDE 10 MG/ML
INJECTION INTRAVENOUS AS NEEDED
Status: DISCONTINUED | OUTPATIENT
Start: 2024-01-19 | End: 2024-01-19

## 2024-01-19 RX ORDER — MIDAZOLAM HYDROCHLORIDE 1 MG/ML
1 INJECTION INTRAMUSCULAR; INTRAVENOUS ONCE
Status: COMPLETED | OUTPATIENT
Start: 2024-01-19 | End: 2024-01-19

## 2024-01-19 RX ORDER — FENTANYL CITRATE 50 UG/ML
INJECTION, SOLUTION INTRAMUSCULAR; INTRAVENOUS AS NEEDED
Status: DISCONTINUED | OUTPATIENT
Start: 2024-01-19 | End: 2024-01-19

## 2024-01-19 RX ORDER — SODIUM CHLORIDE, SODIUM LACTATE, POTASSIUM CHLORIDE, CALCIUM CHLORIDE 600; 310; 30; 20 MG/100ML; MG/100ML; MG/100ML; MG/100ML
75 INJECTION, SOLUTION INTRAVENOUS CONTINUOUS
Status: DISCONTINUED | OUTPATIENT
Start: 2024-01-19 | End: 2024-01-19 | Stop reason: HOSPADM

## 2024-01-19 RX ORDER — SODIUM CHLORIDE, SODIUM LACTATE, POTASSIUM CHLORIDE, CALCIUM CHLORIDE 600; 310; 30; 20 MG/100ML; MG/100ML; MG/100ML; MG/100ML
100 INJECTION, SOLUTION INTRAVENOUS CONTINUOUS
Status: DISCONTINUED | OUTPATIENT
Start: 2024-01-19 | End: 2024-01-19 | Stop reason: HOSPADM

## 2024-01-19 RX ORDER — PHENAZOPYRIDINE HYDROCHLORIDE 200 MG/1
200 TABLET, FILM COATED ORAL 3 TIMES DAILY PRN
Qty: 30 TABLET | Refills: 0 | Status: ON HOLD | OUTPATIENT
Start: 2024-01-19 | End: 2024-03-19 | Stop reason: ALTCHOICE

## 2024-01-19 RX ORDER — FAMOTIDINE 10 MG/ML
20 INJECTION INTRAVENOUS ONCE
Status: COMPLETED | OUTPATIENT
Start: 2024-01-19 | End: 2024-01-19

## 2024-01-19 RX ORDER — ONDANSETRON HYDROCHLORIDE 2 MG/ML
4 INJECTION, SOLUTION INTRAVENOUS ONCE AS NEEDED
Status: DISCONTINUED | OUTPATIENT
Start: 2024-01-19 | End: 2024-01-19 | Stop reason: HOSPADM

## 2024-01-19 RX ORDER — PROPOFOL 10 MG/ML
INJECTION, EMULSION INTRAVENOUS AS NEEDED
Status: DISCONTINUED | OUTPATIENT
Start: 2024-01-19 | End: 2024-01-19

## 2024-01-19 RX ADMIN — ALBUTEROL SULFATE 10 MG: 5 SOLUTION RESPIRATORY (INHALATION) at 20:01

## 2024-01-19 RX ADMIN — HYDROMORPHONE HYDROCHLORIDE 0.5 MG: 1 INJECTION, SOLUTION INTRAMUSCULAR; INTRAVENOUS; SUBCUTANEOUS at 18:38

## 2024-01-19 RX ADMIN — ONDANSETRON 4 MG: 2 INJECTION INTRAMUSCULAR; INTRAVENOUS at 18:34

## 2024-01-19 RX ADMIN — SODIUM CHLORIDE, POTASSIUM CHLORIDE, SODIUM LACTATE AND CALCIUM CHLORIDE 75 ML/HR: 600; 310; 30; 20 INJECTION, SOLUTION INTRAVENOUS at 08:03

## 2024-01-19 RX ADMIN — FAMOTIDINE 20 MG: 10 INJECTION, SOLUTION INTRAVENOUS at 08:04

## 2024-01-19 RX ADMIN — INSULIN HUMAN 5 UNITS: 100 INJECTION, SOLUTION PARENTERAL at 19:54

## 2024-01-19 RX ADMIN — ONDANSETRON 4 MG: 2 INJECTION INTRAMUSCULAR; INTRAVENOUS at 08:04

## 2024-01-19 RX ADMIN — SODIUM CHLORIDE 1000 ML: 9 INJECTION, SOLUTION INTRAVENOUS at 18:29

## 2024-01-19 RX ADMIN — PROPOFOL 20 MG: 10 INJECTION, EMULSION INTRAVENOUS at 09:28

## 2024-01-19 RX ADMIN — FENTANYL CITRATE 75 MCG: 50 INJECTION, SOLUTION INTRAMUSCULAR; INTRAVENOUS at 09:09

## 2024-01-19 RX ADMIN — PHENYLEPHRINE HYDROCHLORIDE 75 MCG: 10 INJECTION INTRAVENOUS at 09:26

## 2024-01-19 RX ADMIN — DEXTROSE MONOHYDRATE 12.5 G: 25 INJECTION, SOLUTION INTRAVENOUS at 19:55

## 2024-01-19 RX ADMIN — PHENYLEPHRINE HYDROCHLORIDE 75 MCG: 10 INJECTION INTRAVENOUS at 09:19

## 2024-01-19 RX ADMIN — DEXAMETHASONE SODIUM PHOSPHATE 6 MG: 10 INJECTION, SOLUTION INTRAMUSCULAR; INTRAVENOUS at 08:05

## 2024-01-19 RX ADMIN — PROPOFOL 160 MG: 10 INJECTION, EMULSION INTRAVENOUS at 09:10

## 2024-01-19 RX ADMIN — PROPOFOL 20 MG: 10 INJECTION, EMULSION INTRAVENOUS at 09:38

## 2024-01-19 RX ADMIN — HYDROMORPHONE HYDROCHLORIDE 0.5 MG: 1 INJECTION, SOLUTION INTRAMUSCULAR; INTRAVENOUS; SUBCUTANEOUS at 18:33

## 2024-01-19 RX ADMIN — CEFTRIAXONE SODIUM 1 G: 1 INJECTION, SOLUTION INTRAVENOUS at 19:58

## 2024-01-19 RX ADMIN — MIDAZOLAM HYDROCHLORIDE 1 MG: 1 INJECTION, SOLUTION INTRAMUSCULAR; INTRAVENOUS at 08:56

## 2024-01-19 RX ADMIN — FENTANYL CITRATE 25 MCG: 50 INJECTION, SOLUTION INTRAMUSCULAR; INTRAVENOUS at 09:37

## 2024-01-19 RX ADMIN — LIDOCAINE HYDROCHLORIDE 30 MG: 20 INJECTION, SOLUTION INFILTRATION; PERINEURAL at 09:08

## 2024-01-19 SDOH — SOCIAL STABILITY: SOCIAL INSECURITY: HAVE YOU HAD THOUGHTS OF HARMING ANYONE ELSE?: NO

## 2024-01-19 SDOH — SOCIAL STABILITY: SOCIAL INSECURITY: DO YOU FEEL UNSAFE GOING BACK TO THE PLACE WHERE YOU ARE LIVING?: NO

## 2024-01-19 SDOH — SOCIAL STABILITY: SOCIAL INSECURITY: WERE YOU ABLE TO COMPLETE ALL THE BEHAVIORAL HEALTH SCREENINGS?: YES

## 2024-01-19 SDOH — SOCIAL STABILITY: SOCIAL INSECURITY: HAS ANYONE EVER THREATENED TO HURT YOUR FAMILY OR YOUR PETS?: NO

## 2024-01-19 SDOH — SOCIAL STABILITY: SOCIAL INSECURITY: ABUSE: ADULT

## 2024-01-19 SDOH — SOCIAL STABILITY: SOCIAL INSECURITY: ARE YOU OR HAVE YOU BEEN THREATENED OR ABUSED PHYSICALLY, EMOTIONALLY, OR SEXUALLY BY ANYONE?: NO

## 2024-01-19 SDOH — SOCIAL STABILITY: SOCIAL INSECURITY: DOES ANYONE TRY TO KEEP YOU FROM HAVING/CONTACTING OTHER FRIENDS OR DOING THINGS OUTSIDE YOUR HOME?: NO

## 2024-01-19 SDOH — SOCIAL STABILITY: SOCIAL INSECURITY: ARE THERE ANY APPARENT SIGNS OF INJURIES/BEHAVIORS THAT COULD BE RELATED TO ABUSE/NEGLECT?: NO

## 2024-01-19 SDOH — SOCIAL STABILITY: SOCIAL INSECURITY: DO YOU FEEL ANYONE HAS EXPLOITED OR TAKEN ADVANTAGE OF YOU FINANCIALLY OR OF YOUR PERSONAL PROPERTY?: NO

## 2024-01-19 SDOH — HEALTH STABILITY: MENTAL HEALTH: CURRENT SMOKER: 0

## 2024-01-19 ASSESSMENT — ACTIVITIES OF DAILY LIVING (ADL)
HEARING - LEFT EAR: FUNCTIONAL
JUDGMENT_ADEQUATE_SAFELY_COMPLETE_DAILY_ACTIVITIES: YES
BATHING: INDEPENDENT
TOILETING: INDEPENDENT
LACK_OF_TRANSPORTATION: PATIENT DECLINED
GROOMING: INDEPENDENT
WALKS IN HOME: INDEPENDENT
DRESSING YOURSELF: INDEPENDENT
ADEQUATE_TO_COMPLETE_ADL: YES
HEARING - RIGHT EAR: FUNCTIONAL
FEEDING YOURSELF: INDEPENDENT
PATIENT'S MEMORY ADEQUATE TO SAFELY COMPLETE DAILY ACTIVITIES?: YES

## 2024-01-19 ASSESSMENT — COLUMBIA-SUICIDE SEVERITY RATING SCALE - C-SSRS
1. IN THE PAST MONTH, HAVE YOU WISHED YOU WERE DEAD OR WISHED YOU COULD GO TO SLEEP AND NOT WAKE UP?: NO
1. IN THE PAST MONTH, HAVE YOU WISHED YOU WERE DEAD OR WISHED YOU COULD GO TO SLEEP AND NOT WAKE UP?: NO
2. HAVE YOU ACTUALLY HAD ANY THOUGHTS OF KILLING YOURSELF?: NO
2. HAVE YOU ACTUALLY HAD ANY THOUGHTS OF KILLING YOURSELF?: NO
6. HAVE YOU EVER DONE ANYTHING, STARTED TO DO ANYTHING, OR PREPARED TO DO ANYTHING TO END YOUR LIFE?: NO
6. HAVE YOU EVER DONE ANYTHING, STARTED TO DO ANYTHING, OR PREPARED TO DO ANYTHING TO END YOUR LIFE?: NO

## 2024-01-19 ASSESSMENT — COGNITIVE AND FUNCTIONAL STATUS - GENERAL
PATIENT BASELINE BEDBOUND: NO
DAILY ACTIVITIY SCORE: 24
MOBILITY SCORE: 24

## 2024-01-19 ASSESSMENT — LIFESTYLE VARIABLES
AUDIT-C TOTAL SCORE: -1
AUDIT-C TOTAL SCORE: -1
HOW MANY STANDARD DRINKS CONTAINING ALCOHOL DO YOU HAVE ON A TYPICAL DAY: PATIENT DECLINED
SKIP TO QUESTIONS 9-10: 0
HOW OFTEN DO YOU HAVE 6 OR MORE DRINKS ON ONE OCCASION: PATIENT DECLINED
HOW OFTEN DO YOU HAVE A DRINK CONTAINING ALCOHOL: PATIENT DECLINED

## 2024-01-19 ASSESSMENT — PAIN SCALES - GENERAL
PAINLEVEL_OUTOF10: 0 - NO PAIN
PAINLEVEL_OUTOF10: 8
PAINLEVEL_OUTOF10: 0 - NO PAIN
PAIN_LEVEL: 0
PAINLEVEL_OUTOF10: 6
PAINLEVEL_OUTOF10: 8
PAINLEVEL_OUTOF10: 0 - NO PAIN

## 2024-01-19 ASSESSMENT — ENCOUNTER SYMPTOMS
FEVER: 0
SHORTNESS OF BREATH: 0
CHILLS: 0
NAUSEA: 0
PSYCHIATRIC NEGATIVE: 1
DIFFICULTY URINATING: 0
ALLERGIC/IMMUNOLOGIC NEGATIVE: 1
EYES NEGATIVE: 1
COUGH: 0
ENDOCRINE NEGATIVE: 1

## 2024-01-19 ASSESSMENT — PATIENT HEALTH QUESTIONNAIRE - PHQ9
SUM OF ALL RESPONSES TO PHQ9 QUESTIONS 1 & 2: 0
1. LITTLE INTEREST OR PLEASURE IN DOING THINGS: NOT AT ALL
2. FEELING DOWN, DEPRESSED OR HOPELESS: NOT AT ALL

## 2024-01-19 ASSESSMENT — PAIN DESCRIPTION - LOCATION
LOCATION: ABDOMEN
LOCATION: ABDOMEN
LOCATION: OTHER (COMMENT)

## 2024-01-19 ASSESSMENT — PAIN DESCRIPTION - ORIENTATION: ORIENTATION: LEFT

## 2024-01-19 ASSESSMENT — PAIN - FUNCTIONAL ASSESSMENT
PAIN_FUNCTIONAL_ASSESSMENT: 0-10

## 2024-01-19 ASSESSMENT — PAIN DESCRIPTION - PAIN TYPE: TYPE: ACUTE PAIN

## 2024-01-19 NOTE — H&P
History Of Present Illness  Tj Miranda is a 58 y.o. male presenting with hydro.     Past Medical History  Past Medical History:   Diagnosis Date    Diabetes mellitus (CMS/HCC)     GERD (gastroesophageal reflux disease)     Hyperlipidemia        Surgical History  Past Surgical History:   Procedure Laterality Date    LITHOTRIPSY          Social History  He reports that he has never smoked. He has never used smokeless tobacco. No history on file for alcohol use and drug use.    Family History  Family History   Problem Relation Name Age of Onset    No Known Problems Father      No Known Problems Mother          Allergies  Patient has no known allergies.    Review of Systems   Constitutional:  Negative for chills and fever.   HENT: Negative.     Eyes: Negative.    Respiratory:  Negative for cough and shortness of breath.    Cardiovascular:  Negative for chest pain and leg swelling.   Gastrointestinal:  Negative for nausea.   Endocrine: Negative.    Genitourinary:  Negative for difficulty urinating.        Negative except for documented in HPI   Allergic/Immunologic: Negative.    Neurological:         Alert & oriented X 3   Hematological:         Denies blood thinners   Psychiatric/Behavioral: Negative.          Physical Exam  Vitals and nursing note reviewed.   Pulmonary:      Effort: Pulmonary effort is normal.      Breath sounds: Normal breath sounds.   Abdominal:      Palpations: Abdomen is soft.      Tenderness: There is no abdominal tenderness.   Genitourinary:     Comments: Kidneys non palpable bilaterally  Bladder non palpable or tender  Neurological:      Mental Status: He is alert.          Last Recorded Vitals  Weight 101 kg (223 lb).         Assessment/Plan   Principal Problem:    Calculus of ureter            Andrew Chacon MD

## 2024-01-19 NOTE — OP NOTE
0Lithotripsy Extracorporeal Shock Wave (L), Cystoscopy Left RPG Left Ureteroscopy with Holmium and Left stent Change (L) Operative Note     Date: 2024  OR Location: [facility] OR    Name: Tj Miranda, : 1965, Age: 58 y.o., MRN: [id number], Sex: male    Diagnosis  Pre-op Diagnosis     * Calculus of ureter [N20.1] Post-op Diagnosis     * Calculus of ureter [N20.1]     Procedures  Lithotripsy Extracorporeal Shock Wave  03585 - [place] LITHOTRIPSY XTRCORP SHOCK WAVE    Cystoscopy Left RPG Left Ureteroscopy with Holmium and Left stent Change  14916 - [place] CYSTO/URETERO W/LITHOTRIPSY &INDWELL STENT INSRT      Surgeons      * Andrew Chacon - Primary    Resident/Fellow/Other Assistant:  Surgeon(s) and Role:    Procedure Summary  Anesthesia: General  ASA: II  Anesthesia Staff: Anesthesiologist: Silvestre Cleary DO  Estimated Blood Loss: 0mL  Intra-op Medications: * No intraprocedure medications in log *           Anesthesia Record               Intraprocedure I/O Totals       None           Specimen: No specimens collected     Staff:   Circulator: Karina Keen RN  Relief Circulator: Cesar Garber RN  Scrub Person: Freedom Malik          Implants:  Implants       Type Name Action Serial No.      Shunt STENT, POLARIS ULTRA  5FR 24CM, DISPOSABLE - [id number] Implanted                 Indications: Tj Miranda is an 58 y.o. male who is having surgery for Calculus of ureter [N20.1].     The patient was seen in the preoperative area. The risks, benefits, complications, treatment options, non-operative alternatives, expected recovery and outcomes were discussed with the patient. The possibilities of reaction to medication, pulmonary aspiration, injury to surrounding structures, bleeding, recurrent infection, the need for additional procedures, failure to diagnose a condition, and creating a complication requiring transfusion or operation were discussed with the patient. The patient concurred with the proposed plan, giving informed  consent.  The site of surgery was properly noted/marked if necessary per policy. The patient has been actively warmed in preoperative area.   Preoperative diagnosis: Left ureteral stone  Postoperative diagnosis: The same  Physician: Oswaldo  Procedure: Cystoscopy with Left RPG, Left ureteroscopy with holmium and stent change, Left ESWL  ESTIMATED BLOOD LOSS: Minimal.    COMPLICATIONS: None.    INDICATIONS AND CONSENT:  After the risks, benefits, alternatives and indications of this procedure were explained to the patient consented.    PROCEDURE:   The patient was brought to the operating room, placed on the table in supine position.  After adequate anesthesia was obtained, the patient was prepped and draped in the standard surgical fashion.  First, a 21 Luxembourgish cystoscope was inserted into the bladder, and formal cystoscopy was performed. A guidewire was placed up the ureter into the renal pelvis using fluoroscopic guidance . A left retrograde pyelogram suggested a filling defect in the ureter. The flexible ureteroscope was taken up to the level of the stone. The stone was visualized and Holmium laser was used to break up the stone that could be reached. After removing all of the stone pieces, we then shot a retrograde pyelogram which did not show any obvious filling defects or additional stones in the ureter.    The ureteroscope was removed and a 5 x 24 double-J stent was placed under direct fluoroscopic vision.  Left ESWL performed, 2500 shocks delivered. The patient tolerated the procedure well and there were no complications.   Attending Attestation: I was present and scrubbed for the entire procedure.      Follow up 2 weeks with LIU Chacon  Phone Number: [phone]

## 2024-01-19 NOTE — ANESTHESIA PROCEDURE NOTES
Airway  Date/Time: 1/19/2024 9:13 AM  Urgency: elective    Airway not difficult    Staffing  Performed: attending   Authorized by: Silvestre Cleary DO    Performed by: Silvestre Cleary DO  Patient location during procedure: OR    Indications and Patient Condition  Indications for airway management: anesthesia  Spontaneous Ventilation: absent  Sedation level: deep  Preoxygenated: yes  Patient position: sniffing  Mask difficulty assessment: 1 - vent by mask    Final Airway Details  Final airway type: supraglottic airway      Successful airway: Supreme  Size 5     Number of attempts at approach: 1    Additional Comments  Pre-O2.  Monitors.  Smooth IV induction.  Easy mask Vent with Oral Airway.  Easy atraumatic placement of LMA. Clear airway.

## 2024-01-19 NOTE — ANESTHESIA POSTPROCEDURE EVALUATION
Patient: Tj Miranda    Procedure Summary       Date: 01/19/24 Room / Location: [facility] OR 05 / [facility] OR    Anesthesia Start: 0904 Anesthesia Stop: 1003    Procedures:       Lithotripsy Extracorporeal Shock Wave (Left)      Cystoscopy Left RPG Left Ureteroscopy with Holmium and Left stent Change (Left) Diagnosis:       Calculus of ureter      (Calculus of ureter [N20.1])    Surgeons: Andrew Chacon MD Responsible Provider: Silvestre Cleary DO    Anesthesia Type: general ASA Status: 2            Anesthesia Type: general    Vitals Value Taken Time   /76 01/19/24 1010   Temp 36.4 °C (97.6 °F) 01/19/24 1000   Pulse 73 01/19/24 1010   Resp 12 01/19/24 1010   SpO2 100 % 01/19/24 1010       Anesthesia Post Evaluation    Patient location during evaluation: PACU  Patient participation: complete - patient participated  Level of consciousness: awake  Pain score: 0  Pain management: adequate  Multimodal analgesia pain management approach  Airway patency: patent  Cardiovascular status: acceptable  Respiratory status: acceptable  Hydration status: acceptable  Postoperative Nausea and Vomiting: none  Comments: Easily awakens.  VSS.  Denies pain or nausea.        No notable events documented.  Tolerated procedure well.  Denies pain or nausea.  VSS.  Clear airway

## 2024-01-19 NOTE — ANESTHESIA PREPROCEDURE EVALUATION
Patient: Tj Miranda    Procedure Information       Date/Time: 01/19/24 0900    Procedures:       Lithotripsy Extracorporeal Shock Wave (Left)      Cystoscopy Left RPG Left Ureteroscopy with Holmium and Left stent Change (Left)    Location: [facility] OR 05 / Virtual [facility] OR    Surgeons: Andrew Chacon MD            Relevant Problems   Anesthesia (within normal limits)      Cardiovascular (within normal limits)      Endocrine (within normal limits)      GI (within normal limits)      /Renal   (+) Cirrhosis (CMS/HCC)   (+) Kidney stones   (+) Renal cyst      Neuro/Psych (within normal limits)      Pulmonary (within normal limits)      GI/Hepatic   (+) Cirrhosis (CMS/HCC)      Hematology (within normal limits)      Musculoskeletal (within normal limits)      Eyes, Ears, Nose, and Throat (within normal limits)      Infectious Disease (within normal limits)     Esoph banding  Clinical information reviewed:   Tobacco  Allergies  Meds   Med Hx  Surg Hx   Fam Hx  Soc Hx        NPO Detail:  NPO/Void Status  Carbohydrate Drink Given Prior to Surgery? : N  Date of Last Liquid: 01/18/24  Time of Last Liquid: 1900  Date of Last Solid: 01/18/24  Time of Last Solid: 1900  Last Intake Type: Clear fluids; Food  Time of Last Void: 0715         PHYSICAL EXAM    Anesthesia Plan    History of general anesthesia?: yes  History of complications of general anesthesia?: no    ASA 2     general     The patient is not a current smoker.    intravenous induction   Anesthetic plan and risks discussed with patient.    GA discussed with Patient.  Plan and process discussed for anesthesia for procedure.  Risks and benefits discussed.  All questions answered with patient.  The patient understands plan and wishes to proceed.

## 2024-01-20 LAB
ANION GAP SERPL CALC-SCNC: 13 MMOL/L (ref 10–20)
BUN SERPL-MCNC: 25 MG/DL (ref 6–23)
CALCIUM SERPL-MCNC: 8.2 MG/DL (ref 8.6–10.3)
CHLORIDE SERPL-SCNC: 102 MMOL/L (ref 98–107)
CO2 SERPL-SCNC: 24 MMOL/L (ref 21–32)
CREAT SERPL-MCNC: 1.2 MG/DL (ref 0.5–1.3)
EGFRCR SERPLBLD CKD-EPI 2021: 69 ML/MIN/1.73M*2
ERYTHROCYTE [DISTWIDTH] IN BLOOD BY AUTOMATED COUNT: 13.2 % (ref 11.5–14.5)
GLUCOSE BLD MANUAL STRIP-MCNC: 255 MG/DL (ref 74–99)
GLUCOSE BLD MANUAL STRIP-MCNC: 255 MG/DL (ref 74–99)
GLUCOSE BLD MANUAL STRIP-MCNC: 256 MG/DL (ref 74–99)
GLUCOSE BLD MANUAL STRIP-MCNC: 265 MG/DL (ref 74–99)
GLUCOSE SERPL-MCNC: 317 MG/DL (ref 74–99)
HCT VFR BLD AUTO: 33.5 % (ref 41–52)
HGB BLD-MCNC: 12 G/DL (ref 13.5–17.5)
HOLD SPECIMEN: NORMAL
MCH RBC QN AUTO: 33.2 PG (ref 26–34)
MCHC RBC AUTO-ENTMCNC: 35.8 G/DL (ref 32–36)
MCV RBC AUTO: 93 FL (ref 80–100)
NRBC BLD-RTO: 0 /100 WBCS (ref 0–0)
PLATELET # BLD AUTO: 64 X10*3/UL (ref 150–450)
POTASSIUM SERPL-SCNC: 4.9 MMOL/L (ref 3.5–5.3)
RBC # BLD AUTO: 3.61 X10*6/UL (ref 4.5–5.9)
SODIUM SERPL-SCNC: 134 MMOL/L (ref 136–145)
WBC # BLD AUTO: 10.3 X10*3/UL (ref 4.4–11.3)

## 2024-01-20 PROCEDURE — 36415 COLL VENOUS BLD VENIPUNCTURE: CPT | Performed by: INTERNAL MEDICINE

## 2024-01-20 PROCEDURE — 2500000001 HC RX 250 WO HCPCS SELF ADMINISTERED DRUGS (ALT 637 FOR MEDICARE OP)

## 2024-01-20 PROCEDURE — 85027 COMPLETE CBC AUTOMATED: CPT | Performed by: INTERNAL MEDICINE

## 2024-01-20 PROCEDURE — 2500000001 HC RX 250 WO HCPCS SELF ADMINISTERED DRUGS (ALT 637 FOR MEDICARE OP): Performed by: INTERNAL MEDICINE

## 2024-01-20 PROCEDURE — 82947 ASSAY GLUCOSE BLOOD QUANT: CPT

## 2024-01-20 PROCEDURE — 99232 SBSQ HOSP IP/OBS MODERATE 35: CPT

## 2024-01-20 PROCEDURE — 80048 BASIC METABOLIC PNL TOTAL CA: CPT | Performed by: INTERNAL MEDICINE

## 2024-01-20 PROCEDURE — 2500000004 HC RX 250 GENERAL PHARMACY W/ HCPCS (ALT 636 FOR OP/ED): Performed by: INTERNAL MEDICINE

## 2024-01-20 PROCEDURE — 1100000001 HC PRIVATE ROOM DAILY

## 2024-01-20 RX ORDER — GLIPIZIDE 2.5 MG/1
2.5 TABLET, EXTENDED RELEASE ORAL
Status: DISCONTINUED | OUTPATIENT
Start: 2024-01-20 | End: 2024-01-21

## 2024-01-20 RX ORDER — ATORVASTATIN CALCIUM 10 MG/1
10 TABLET, FILM COATED ORAL NIGHTLY
Status: DISCONTINUED | OUTPATIENT
Start: 2024-01-20 | End: 2024-01-20 | Stop reason: DRUGHIGH

## 2024-01-20 RX ORDER — ACETAMINOPHEN 650 MG/1
650 SUPPOSITORY RECTAL EVERY 4 HOURS PRN
Status: DISCONTINUED | OUTPATIENT
Start: 2024-01-20 | End: 2024-01-21 | Stop reason: HOSPADM

## 2024-01-20 RX ORDER — ACETAMINOPHEN 160 MG/5ML
650 SOLUTION ORAL EVERY 4 HOURS PRN
Status: DISCONTINUED | OUTPATIENT
Start: 2024-01-20 | End: 2024-01-21 | Stop reason: HOSPADM

## 2024-01-20 RX ORDER — MORPHINE SULFATE 2 MG/ML
2 INJECTION, SOLUTION INTRAMUSCULAR; INTRAVENOUS
Status: DISCONTINUED | OUTPATIENT
Start: 2024-01-20 | End: 2024-01-21 | Stop reason: HOSPADM

## 2024-01-20 RX ORDER — ATORVASTATIN CALCIUM 20 MG/1
20 TABLET, FILM COATED ORAL NIGHTLY
Status: DISCONTINUED | OUTPATIENT
Start: 2024-01-20 | End: 2024-01-21 | Stop reason: HOSPADM

## 2024-01-20 RX ORDER — ONDANSETRON HYDROCHLORIDE 2 MG/ML
4 INJECTION, SOLUTION INTRAVENOUS EVERY 8 HOURS PRN
Status: DISCONTINUED | OUTPATIENT
Start: 2024-01-20 | End: 2024-01-21 | Stop reason: HOSPADM

## 2024-01-20 RX ORDER — ACETAMINOPHEN 500 MG
2000 TABLET ORAL DAILY
COMMUNITY

## 2024-01-20 RX ORDER — MAGNESIUM HYDROXIDE 2400 MG/10ML
10 SUSPENSION ORAL DAILY PRN
Status: DISCONTINUED | OUTPATIENT
Start: 2024-01-20 | End: 2024-01-21 | Stop reason: HOSPADM

## 2024-01-20 RX ORDER — CEFTRIAXONE 1 G/50ML
1 INJECTION, SOLUTION INTRAVENOUS EVERY 24 HOURS
Status: DISCONTINUED | OUTPATIENT
Start: 2024-01-20 | End: 2024-01-21

## 2024-01-20 RX ORDER — ONDANSETRON 4 MG/1
4 TABLET, ORALLY DISINTEGRATING ORAL EVERY 8 HOURS PRN
Status: DISCONTINUED | OUTPATIENT
Start: 2024-01-20 | End: 2024-01-21 | Stop reason: HOSPADM

## 2024-01-20 RX ORDER — ACETAMINOPHEN 325 MG/1
650 TABLET ORAL EVERY 4 HOURS PRN
Status: DISCONTINUED | OUTPATIENT
Start: 2024-01-20 | End: 2024-01-21 | Stop reason: HOSPADM

## 2024-01-20 RX ORDER — PANTOPRAZOLE SODIUM 40 MG/1
40 TABLET, DELAYED RELEASE ORAL 2 TIMES DAILY
Status: DISCONTINUED | OUTPATIENT
Start: 2024-01-20 | End: 2024-01-21 | Stop reason: HOSPADM

## 2024-01-20 RX ORDER — SODIUM CHLORIDE 9 MG/ML
75 INJECTION, SOLUTION INTRAVENOUS CONTINUOUS
Status: DISCONTINUED | OUTPATIENT
Start: 2024-01-20 | End: 2024-01-20

## 2024-01-20 RX ADMIN — INSULIN LISPRO 2.75 UNITS: 100 INJECTION, SOLUTION INTRAVENOUS; SUBCUTANEOUS at 09:30

## 2024-01-20 RX ADMIN — GLIPIZIDE 2.5 MG: 2.5 TABLET, EXTENDED RELEASE ORAL at 09:30

## 2024-01-20 RX ADMIN — INSULIN LISPRO 2.3 UNITS: 100 INJECTION, SOLUTION INTRAVENOUS; SUBCUTANEOUS at 20:41

## 2024-01-20 RX ADMIN — MAGNESIUM HYDROXIDE 10 ML: 2400 SUSPENSION ORAL at 16:54

## 2024-01-20 RX ADMIN — PANTOPRAZOLE SODIUM 40 MG: 40 TABLET, DELAYED RELEASE ORAL at 20:41

## 2024-01-20 RX ADMIN — CEFTRIAXONE SODIUM 1 G: 1 INJECTION, SOLUTION INTRAVENOUS at 17:54

## 2024-01-20 RX ADMIN — PANTOPRAZOLE SODIUM 40 MG: 40 TABLET, DELAYED RELEASE ORAL at 09:30

## 2024-01-20 RX ADMIN — ATORVASTATIN CALCIUM 20 MG: 20 TABLET, FILM COATED ORAL at 20:41

## 2024-01-20 RX ADMIN — SODIUM CHLORIDE 75 ML/HR: 9 INJECTION, SOLUTION INTRAVENOUS at 01:45

## 2024-01-20 RX ADMIN — INSULIN LISPRO 3.1 UNITS: 100 INJECTION, SOLUTION INTRAVENOUS; SUBCUTANEOUS at 13:14

## 2024-01-20 RX ADMIN — INSULIN LISPRO 2.75 UNITS: 100 INJECTION, SOLUTION INTRAVENOUS; SUBCUTANEOUS at 17:54

## 2024-01-20 ASSESSMENT — COGNITIVE AND FUNCTIONAL STATUS - GENERAL
MOBILITY SCORE: 24
DAILY ACTIVITIY SCORE: 24
DAILY ACTIVITIY SCORE: 24
MOBILITY SCORE: 24

## 2024-01-20 ASSESSMENT — PAIN - FUNCTIONAL ASSESSMENT
PAIN_FUNCTIONAL_ASSESSMENT: 0-10
PAIN_FUNCTIONAL_ASSESSMENT: 0-10

## 2024-01-20 ASSESSMENT — PAIN SCALES - GENERAL
PAINLEVEL_OUTOF10: 2
PAINLEVEL_OUTOF10: 1

## 2024-01-20 ASSESSMENT — ACTIVITIES OF DAILY LIVING (ADL): LACK_OF_TRANSPORTATION: NO

## 2024-01-20 NOTE — PROGRESS NOTES
01/20/24 1059   Discharge Planning   Living Arrangements Spouse/significant other   Support Systems Spouse/significant other   Assistance Needed independent in all care, denies falls or use of assistive devices.   Type of Residence Private residence   Number of Stairs to Enter Residence 3   Number of Stairs Within Residence 0   Do you have animals or pets at home? Yes   Type of Animals or Pets dogs, cats, ducks, chickens.   Who is requesting discharge planning? Provider   Home or Post Acute Services None   Patient expects to be discharged to: home- no needs   Does the patient need discharge transport arranged? No   Financial Resource Strain   How hard is it for you to pay for the very basics like food, housing, medical care, and heating? Somewhat   Housing Stability   In the last 12 months, was there a time when you were not able to pay the mortgage or rent on time? N   In the last 12 months, how many places have you lived? 1   In the last 12 months, was there a time when you did not have a steady place to sleep or slept in a shelter (including now)? N   Transportation Needs   In the past 12 months, has lack of transportation kept you from medical appointments or from getting medications? no   In the past 12 months, has lack of transportation kept you from meetings, work, or from getting things needed for daily living? No     Met with patient in room, role of TCC explained, address, telephone, contact number confirmed. PCP is Dr. Duong with last visit this past week. Pharmacy is [facility]- is diabetic on po meds. Able to afford and obtain meds. [facility], no DME, no falls. Plan home no needs.

## 2024-01-20 NOTE — CARE PLAN
The patient's goals for the shift include  less pain     The clinical goals for the shift include no pain, manage pain, hgb wdl

## 2024-01-20 NOTE — ED PROVIDER NOTES
HPI   Chief Complaint   Patient presents with    Flank Pain    Abdominal Pain     Pt had lithotripsy and stent placed with Dr. Chacon today and is having severe pain in left flank/abd and nausea. Pt's wife contacted Dr. Chacon and was advised to increase to 2 norco and they did with no relief. He advised coming here. Initially trigered SERS alert d/t temp, however rechecked temp orally and it was 97.7. Dr. Johnson and Terence RN made aware.        Patient presents with left-sided flank pain after procedure today.  States he had lithotripsy and a stent replacement by Dr. Chacon earlier today.  His postoperative course was uneventful until this evening.  He developed significant pain that he states caused him to nearly vomit.  He feels like this is different from his other kidney stone pain.  He denies any fever or chills.  He has had continued hematuria since the procedure without any acute change.      History provided by:  Patient and spouse                      [place] Coma Scale Score: 15                  Patient History   Past Medical History:   Diagnosis Date    Diabetes mellitus (CMS/HCC)     GERD (gastroesophageal reflux disease)     Hyperlipidemia      Past Surgical History:   Procedure Laterality Date    FOOT SURGERY Left     HAND SURGERY Bilateral     LITHOTRIPSY      MANDIBLE SURGERY N/A      Family History   Problem Relation Name Age of Onset    No Known Problems Father      No Known Problems Mother       Social History     Tobacco Use    Smoking status: Never    Smokeless tobacco: Never   Substance Use Topics    Alcohol use: Never    Drug use: Not on file       Physical Exam   ED Triage Vitals [01/19/24 1800]   Temp Heart Rate Respirations BP   36.5 °C (97.7 °F) 98 16 132/88      Pulse Ox Temp Source Heart Rate Source Patient Position   95 % Temporal Monitor --      BP Location FiO2 (%)     -- --       Physical Exam  Vitals and nursing note reviewed.   Constitutional:       General: He is not in acute  distress.     Appearance: Normal appearance. He is well-developed, well-groomed and normal weight. He is not ill-appearing or toxic-appearing.      Comments: Patient appears decades older than stated age.  Very pale   HENT:      Head: Normocephalic.      Nose: Nose normal.      Mouth/Throat:      Mouth: Mucous membranes are moist.      Comments: Pale mucosa  Eyes:      General: No scleral icterus.  Cardiovascular:      Rate and Rhythm: Normal rate and regular rhythm.      Pulses:           Dorsalis pedis pulses are 2+ on the right side and 2+ on the left side.        Posterior tibial pulses are 2+ on the right side and 2+ on the left side.   Pulmonary:      Effort: Pulmonary effort is normal.      Breath sounds: Normal breath sounds and air entry.   Abdominal:      General: Bowel sounds are normal. There is distension.      Palpations: Abdomen is soft.      Tenderness: There is no abdominal tenderness. There is left CVA tenderness. There is no right CVA tenderness, guarding or rebound. Negative signs include Le's sign and McBurney's sign.   Skin:     General: Skin is warm.      Capillary Refill: Capillary refill takes less than 2 seconds.      Coloration: Skin is pale.   Neurological:      General: No focal deficit present.      Mental Status: He is alert and oriented to person, place, and time.      Cranial Nerves: No cranial nerve deficit or facial asymmetry.      Motor: No weakness.   Psychiatric:         Attention and Perception: Attention and perception normal.         Mood and Affect: Mood and affect normal.         Speech: Speech normal.         Behavior: Behavior normal. Behavior is cooperative.         Thought Content: Thought content normal.         Cognition and Memory: Cognition and memory normal.         Judgment: Judgment normal.         ED Course & MDM   ED Course as of 01/19/24 2112 Fri Jan 19, 2024 1904 Dr Chacon []      ED Course User Index  [] Teresa Rizo PA-C         Diagnoses as of  01/19/24 2112   Closed hematoma of left kidney, initial encounter   Urinary tract infection with hematuria, site unspecified   Hyperkalemia   АНДРЕЙ (acute kidney injury) (CMS/[facility])   Hyperglycemia       Medical Decision Making  Patient presents with left-sided flank pain after procedure today.  States he had lithotripsy and a stent replacement by Dr. Chacon earlier today.  His postoperative course was uneventful until this evening.  He developed significant pain that he states caused him to nearly vomit.  He feels like this is different from his other kidney stone pain.  He denies any fever or chills.  He has had continued hematuria since the procedure without any acute change.    Ddx: Postoperative complication, obstruction, infection, metabolic, other  We will obtain labs and radiographic studies.  Urinalysis concerning for infection although no white cells or leukocyte esterase were noted only nitrates.  Will still treat as patient has recent instrumentation.  Patient's white blood cell count is within normal limits as well as his hemoglobin and hematocrit.  Patient's electrolytes have some significant concerns with hyperglycemia АНДРЕЙ and hyperkalemia.  Patient will be treated with IV Rocephin per Dr. Chacon's request.  IV fluids also given with albuterol nebulized treatment and insulin with D5 normal saline for the hyperkalemia.  This will also help with patient's hyperglycemia.  Dr. Chacon would like patient admitted under the hospitalist to be able to monitor patient's hemoglobin and control his pain better.  Consult to the hospitalist for admission with acceptance.    The patient was seen by the advanced practice provider.  I have personally performed a substantive portion of the encounter.  I have seen and examined the patient; agree with the workup, evaluation, MDM, management and diagnosis.  The care plan has been discussed.    I personally saw the patient and made/approved the management plan and take  responsibility for the patient's management.   History: Flank pain, nausea.  Patient with recent history of lithotripsy and stent placed by Dr. Chacon.  He did increase his Norco to 2 Norco with no relief of his pain.  Patient was referred to the ED by Dr. Chacon for evaluation.  Exam: Heart regular rate and rhythm without murmurs lungs are clear to auscultation.  Abdomen soft benign without rebound rigidity guarding noted.  Patient does have left-sided CVA tenderness worse with percussion.  MDM: Patient was seen and evaluated after lithotripsy and stent placement with increasing pain.  Patient was seen and evaluated for potential infection, postoperative complication, bleeding or obstruction.  Patient urinalysis revealed evidence of potential infection.  Patient was noted to have a hematoma of the left kidney acute kidney injury hyperglycemia and per kalemia.  Patient was treated with IV Rocephin and admitted to the hospitalist with plan for consultation with Dr. Shelby.  Patient was admitted to the hospital in stable satisfactory condition.    Juan F Johnson, DO     Problems Addressed:  АНДРЕЙ (acute kidney injury) (CMS/[facility]): undiagnosed new problem with uncertain prognosis  Closed hematoma of left kidney, initial encounter: undiagnosed new problem with uncertain prognosis     Details: Postoperative complication  Hyperglycemia: acute illness or injury  Hyperkalemia: undiagnosed new problem with uncertain prognosis     Details: Treatment with albuterol, insulin, dextrose and IV fluids  Urinary tract infection with hematuria, site unspecified: acute illness or injury     Details: Treatment with Rocephin per Dr. Chacon's request    Amount and/or Complexity of Data Reviewed  Labs: ordered. Decision-making details documented in ED Course.  Radiology: ordered and independent interpretation performed. Decision-making details documented in ED Course.    Risk  Decision regarding hospitalization.  Diagnosis or treatment  significantly limited by social determinants of health.        Procedure  Procedures     Teresa Rizo PA-C  01/19/24 2112       Teresa Rizo PA-C  01/19/24 2120       Juan F Johnson DO  01/22/24 3661

## 2024-01-20 NOTE — CARE PLAN
The patient's goals for the shift include      The clinical goals for the shift include no pain      Problem: Pain - Adult  Goal: Verbalizes/displays adequate comfort level or baseline comfort level  Outcome: Progressing     Problem: Safety - Adult  Goal: Free from fall injury  Outcome: Progressing     Problem: Discharge Planning  Goal: Discharge to home or other facility with appropriate resources  Outcome: Progressing     Problem: Chronic Conditions and Co-morbidities  Goal: Patient's chronic conditions and co-morbidity symptoms are monitored and maintained or improved  Outcome: Progressing     Problem: Diabetes  Goal: Achieve decreasing blood glucose levels by end of shift  Outcome: Progressing  Goal: Increase stability of blood glucose readings by end of shift  Outcome: Progressing  Goal: Decrease in ketones present in urine by end of shift  Outcome: Progressing  Goal: Maintain electrolyte levels within acceptable range throughout shift  Outcome: Progressing  Goal: Maintain glucose levels >70mg/dl to <250mg/dl throughout shift  Outcome: Progressing  Goal: No changes in neurological exam by end of shift  Outcome: Progressing  Goal: Learn about and adhere to nutrition recommendations by end of shift  Outcome: Progressing  Goal: Vital signs within normal range for age by end of shift  Outcome: Progressing  Goal: Increase self care and/or family involovement by end of shift  Outcome: Progressing  Goal: Receive DSME education by end of shift  Outcome: Progressing     Problem: Pain  Goal: Takes deep breaths with improved pain control throughout the shift  Outcome: Progressing  Goal: Turns in bed with improved pain control throughout the shift  Outcome: Progressing  Goal: Walks with improved pain control throughout the shift  Outcome: Progressing  Goal: Performs ADL's with improved pain control throughout shift  Outcome: Progressing  Goal: Participates in PT with improved pain control throughout the shift  Outcome:  Progressing  Goal: Free from opioid side effects throughout the shift  Outcome: Progressing  Goal: Free from acute confusion related to pain meds throughout the shift  Outcome: Progressing

## 2024-01-20 NOTE — PROGRESS NOTES
Tj Miranda is a 58 y.o. male on day 1 of admission presenting with Closed hematoma of left kidney, initial encounter.      Subjective   Patient is upset when staff entered room when he was informed that urology does not typically come to the hospital to see patients.  Time spent in conversation and answering questions.  Patient calmer after talking with him.  He complains of minimal pain to left flank, lateral abdomen.  He states pain is tolerable and does not need pain medicine at this time       Objective     Last Recorded Vitals  /82   Pulse 104   Temp 37 °C (98.6 °F)   Resp 18   Wt 102 kg (225 lb)   SpO2 96%   Intake/Output last 3 Shifts:    Intake/Output Summary (Last 24 hours) at 1/20/2024 1351  Last data filed at 1/20/2024 0930  Gross per 24 hour   Intake 562.5 ml   Output --   Net 562.5 ml       Admission Weight  Weight: 102 kg (225 lb) (01/19/24 1800)    Daily Weight  01/19/24 : 102 kg (225 lb)    Image Results  CT abdomen pelvis wo IV contrast  Narrative: Interpreted By:  Norm Cagle,   STUDY:  CT ABDOMEN PELVIS WO IV CONTRAST;  1/19/2024 7:46 pm      INDICATION:  Signs/Symptoms:flank pain.      COMPARISON:  11/28/2023      ACCESSION NUMBER(S):  [id number]      ORDERING CLINICIAN:  DELANEY CASILLAS      TECHNIQUE:  Contiguous axial images of the abdomen and pelvis were obtained  without intravenous contrast. Coronal and sagittal reformatted images  were reconstructed from the axial data.      FINDINGS:  LOWER CHEST: There is scattered bibasilar atelectasis, greatest in  the right middle lobe and left lower lobe. No pleural effusion.      Note: The absence of intravenous contrast limits evaluation of the  solid organs and vasculature.      ABDOMEN/PELVIS:      ABDOMINAL WALL: Small fat-containing umbilical hernia.Small  fat-containing left inguinal hernia.      LIVER: Cirrhotic liver morphology.      BILE DUCTS: There is unchanged dilatation of the common bile duct  measuring 1 cm.       GALLBLADDER: Mild generalized wall edema relating to cirrhosis,  unchanged. No other significant abnormality.      PANCREAS: No significant abnormality.      SPLEEN: Enlarged, measuring 18.8 cm in AP dimension, minimally  decreased from prior study.      ADRENALS: No significant abnormality.      KIDNEYS, URETERS, BLADDER: There is a large, hyperdense subcapsular  hematoma involving the left kidney. Its maximal thickness is  approximately 4.6 cm. Its overall transaxial dimensions are 11.1 cm x  10.5 cm. It extends from the superior to inferior pole with  craniocaudal length of 15.1 cm. There is a 3.6 cm x 2.7 cm area of  blood products of lower density involving the medial interpolar  region (axial image 72/190, series 2; sagittal image 48/124, series  6) does not correlate to any renal cyst/lesion on prior study;  therefore, this is concerning for non clotted blood products that  could represent hyperacute non clotted blood products that can be  seen with coagulopathy or active bleeding. There is severe  compression of the left kidney secondary to the subcapsular hematoma.  There is also moderate amount of hyperdense hemorrhage in the left  perirenal space extending along the iliopsoas retroperitoneal space  to the left pelvic sidewall. There has been interval fragmentation of  previous left renal calculi, some of which have extended retrograde  into calices and others of which extend into the renal pelvis. There  is a left nephroureteral stent extending from the renal pelvis into  the bladder. No ureteral calculus is seen along the course of the  stent. The right kidney is normal in size without calculus or  hydronephrosis. There is a small amount of gas in the urinary bladder  lumen that may relate to the recent procedure. There is a new  subcentimeter calculus in the artery bladder.      REPRODUCTIVE ORGANS: No significant abnormality.      VESSELS: Mild aortic atherosclerosis without AAA.  Extensive  esophageal varices, recanalized umbilical vein, dilated portal venous  system and splenic vein due to portal hypertension, similar to prior  study. There also perirenal varices along the renal veins. The aorta  is mildly atherosclerotic without abdominal aortic aneurysm.      RETROPERITONEUM/LYMPH NODES: Similar prior study there are prominent,  likely chronically reactive lymph nodes owing to cirrhosis in the  nilson hepatis, periceliac region, and para-aortic and aortocaval  locations. No acute retroperitoneal abnormality.      BOWEL/MESENTERY/PERITONEUM:  No inflammatory bowel wall thickening or  dilatation. Stable mild dilatation of the distal appendix without  inflammatory changes due to intraluminal fecalith material. There is  unchanged haziness in the central mesentery likely owing to  mesenteric panniculitis. There is bilobed descending duodenal  diverticulum without associated inflammation.      There is trace ascites. There is no free intraperitoneal air.          MUSCULOSKELETAL:  No acute osseous abnormality.      Impression: 1. Large acute subcapsular hematoma involving the left kidney causing  renal compression and measuring approximately 11.1 cm x 10.5 cm in  transaxial dimension of uncertain craniocaudal dimension. An area  that could represent non clotted blood products as seen with recent  or active bleeding or coagulopathy is noted. Recommend correlation  with hemoglobin/hematocrit level trending. Recommend consult with  urology.      2. Interval fragmentation of previous left renal calculi without  evidence of a ureteral calculus status post lithotripsy and stent  placement extending from the renal pelvis to the urinary bladder. A  single new subcentimeter bladder calculus is noted. No calculi along  the course of the left ureteral stent except for those seen in the  renal pelvis and calices.      3. Cirrhotic liver morphology with evidence of portal hypertension  including  gastroesophageal varices, recanalized umbilical vein, and  splenomegaly.      4.      MACRO:  Norm Cagle discussed the significance and urgency of this  critical finding by telephone with  DELANEY CASILLAS on 1/19/2024 at 8:16  pm.  (**-RCF-**) Findings:  See findings.      Signed by: Norm Cagle 1/19/2024 8:18 PM  Dictation workstation:   [id number]      Physical Exam  General Appearance: AAO x 3, not in acute distress  Skin: skin color pink, warm, and dry; no suspicious rashes or lesions  Eyes : PERRL, EOM's intact  ENT: mucous membranes pink and moist  Neck: normocephalic  Respiratory: lungs clear to auscultation anteriorly; no wheezing, rhonchi, or crackles.   Heart: regular rate and rhythm.   Abdomen: Nondistended, positive bowel sounds x4, soft, tenderness to left lateral abdomen  Extremities: no edema   Peripheral pulses: normal x4 extremities  Neuro: alert, coherent and conversant, no focal motor deficits    Relevant Results  Results for orders placed or performed during the hospital encounter of 01/19/24 (from the past 24 hour(s))   Comprehensive metabolic panel   Result Value Ref Range    Glucose 414 (H) 74 - 99 mg/dL    Sodium 133 (L) 136 - 145 mmol/L    Potassium 5.9 (H) 3.5 - 5.3 mmol/L    Chloride 99 98 - 107 mmol/L    Bicarbonate 25 21 - 32 mmol/L    Anion Gap 15 10 - 20 mmol/L    Urea Nitrogen 24 (H) 6 - 23 mg/dL    Creatinine 1.32 (H) 0.50 - 1.30 mg/dL    eGFR 62 >60 mL/min/1.73m*2    Calcium 9.1 8.6 - 10.3 mg/dL    Albumin 3.7 3.4 - 5.0 g/dL    Alkaline Phosphatase 80 33 - 136 U/L    Total Protein 6.8 6.4 - 8.2 g/dL    AST 26 9 - 39 U/L    Bilirubin, Total 2.6 (H) 0.0 - 1.2 mg/dL    ALT 23 10 - 52 U/L   CBC   Result Value Ref Range    WBC 4.9 4.4 - 11.3 x10*3/uL    nRBC 0.0 0.0 - 0.0 /100 WBCs    RBC 4.05 (L) 4.50 - 5.90 x10*6/uL    Hemoglobin 13.2 (L) 13.5 - 17.5 g/dL    Hematocrit 37.5 (L) 41.0 - 52.0 %    MCV 93 80 - 100 fL    MCH 32.6 26.0 - 34.0 pg    MCHC 35.2 32.0 - 36.0 g/dL    RDW  13.3 11.5 - 14.5 %    Platelets 61 (L) 150 - 450 x10*3/uL   Troponin I, High Sensitivity   Result Value Ref Range    Troponin I, High Sensitivity 3 0 - 20 ng/L   Urinalysis with Reflex Culture and Microscopic   Result Value Ref Range    Color, Urine Karen (N) Straw, Yellow    Appearance, Urine Hazy (N) Clear    Specific Gravity, Urine 1.035 1.005 - 1.035    pH, Urine 5.0 5.0, 5.5, 6.0, 6.5, 7.0, 7.5, 8.0    Protein, Urine 100 (2+) (N) NEGATIVE mg/dL    Glucose, Urine >=500 (3+) (A) NEGATIVE mg/dL    Blood, Urine LARGE (3+) (A) NEGATIVE    Ketones, Urine 5 (TRACE) (A) NEGATIVE mg/dL    Bilirubin, Urine NEGATIVE NEGATIVE    Urobilinogen, Urine 2.0 (N) <2.0 mg/dL    Nitrite, Urine POSITIVE (A) NEGATIVE    Leukocyte Esterase, Urine NEGATIVE NEGATIVE   Extra Urine Gray Tube   Result Value Ref Range    Extra Tube Hold for add-ons.    Microscopic Only, Urine   Result Value Ref Range    WBC, Urine NONE 1-5, NONE /HPF    RBC, Urine >20 (A) NONE, 1-2, 3-5 /HPF    Mucus, Urine 1+ Reference range not established. /LPF   POCT GLUCOSE   Result Value Ref Range    POCT Glucose 396 (H) 74 - 99 mg/dL   Basic metabolic panel   Result Value Ref Range    Glucose 317 (H) 74 - 99 mg/dL    Sodium 134 (L) 136 - 145 mmol/L    Potassium 4.9 3.5 - 5.3 mmol/L    Chloride 102 98 - 107 mmol/L    Bicarbonate 24 21 - 32 mmol/L    Anion Gap 13 10 - 20 mmol/L    Urea Nitrogen 25 (H) 6 - 23 mg/dL    Creatinine 1.20 0.50 - 1.30 mg/dL    eGFR 69 >60 mL/min/1.73m*2    Calcium 8.2 (L) 8.6 - 10.3 mg/dL   CBC   Result Value Ref Range    WBC 10.3 4.4 - 11.3 x10*3/uL    nRBC 0.0 0.0 - 0.0 /100 WBCs    RBC 3.61 (L) 4.50 - 5.90 x10*6/uL    Hemoglobin 12.0 (L) 13.5 - 17.5 g/dL    Hematocrit 33.5 (L) 41.0 - 52.0 %    MCV 93 80 - 100 fL    MCH 33.2 26.0 - 34.0 pg    MCHC 35.8 32.0 - 36.0 g/dL    RDW 13.2 11.5 - 14.5 %    Platelets 64 (L) 150 - 450 x10*3/uL   POCT GLUCOSE   Result Value Ref Range    POCT Glucose 255 (H) 74 - 99 mg/dL   POCT GLUCOSE   Result Value  "Ref Range    POCT Glucose 255 (H) 74 - 99 mg/dL     Scheduled medications  atorvastatin, 20 mg, oral, Nightly  cefTRIAXone, 1 g, intravenous, q24h  glipiZIDE XL, 2.5 mg, oral, Daily with breakfast  insulin lispro, 0-15 Units, subcutaneous, With meals & nightly  pantoprazole, 40 mg, oral, BID      Continuous medications  sodium chloride 0.9%, 75 mL/hr, Last Rate: 75 mL/hr (01/20/24 0603)      PRN medications  PRN medications: acetaminophen **OR** acetaminophen **OR** acetaminophen, dextrose 10 % in water (D10W), dextrose, glucagon, insulin lispro, magnesium hydroxide, morphine, ondansetron ODT **OR** ondansetron    Assessment/Plan      Principal Problem:    Closed hematoma of left kidney, initial encounter    58-year-old male with past medical history of kidney stones, liver cirrhosis, hyperlipidemia, and diabetes mellitus who presented to the emergency room for worsening left-sided flank pain after having done on the same day an elective lithotripsy procedure with ureteral stent placement.  On presentation, blood workup showed elevated creatinine level, hyperkalemia, hyperglycemia. CT scan of the abdomen and pelvis showed \"Large acute subcapsular hematoma involving the left kidney causing renal compression and measuring approximately 11.1 cm x 10.5 cm.  Urinalysis came back positive for nitrites.     Plan:  AM labs ordered    Left subscapular hematoma/left flank pain  -Urology consulted-spoke with Dr. Chacon and verbalized if H&H is stable in a.m. patient can discharge home with follow-up in a couple weeks.  Informed patient and wife  -Continue pain management  -Continue IV fluids.  Can DC when fluid intake adequate  -Monitor H&H.  Dropped 1 g since admission.  No active bleeding noted  -Continue Rocephin    hyperLipidemia  -Continue atorvastatin    Diabetes  -Continue glipizide sliding scale.  Blood sugar has been elevated since admission.  Last hemoglobin A1c was 8.3    Discharge disposition: Anticipate discharge " home in 24 hours if H&H is stable.  Patient to follow-up with urology in a couple weeks.  Spoke with spouse and encouraged him to follow with urology a couple days after discharge    Miracle Sanchez, CHEVY-CNP

## 2024-01-20 NOTE — H&P
"History Of Present Illness  Tj Miranda is a 58 y.o. male  Who presented to the emergency room for left-sided flank pain and nausea after having earlier in the day an elective lithotripsy procedure with ureteral stent placement.  On presentation, vital signs grossly within normal limits.  Pertinent findings on blood workup; glucose 414, sodium 133, potassium 5.9, creatinine 1.32 and bilirubin 2.6.  Urinalysis came back positive for nitrite and blood.  CT scan of the abdomen and pelvis showed \"Large acute subcapsular hematoma involving the left kidney causing renal compression and measuring approximately 11.1 cm x 10.5 cm.  There is also cirrhotic liver morphology.\"  ER physician reached out to the patient's urologist and the latter recommended admission to the hospital.  Patient was given in the ER 1 g IV ceftriaxone, 6 mg IV dexamethasone, IV fluids, Dilaudid, and insulin.  Patient was then admitted to the medical service for further investigation and management.  Patient resting comfortably in his bed now.  He said that after the procedure, he went back home.  Once he reached home, he started experiencing progressively worsening pain in the left flank area associated with severe nausea.  He did not vomit.  Did not have any fever or chills.     ROS  10 systems were reviewed and were negative except for those noted in the history of present illness.    Past Medical History  Past Medical History:   Diagnosis Date    Diabetes mellitus (CMS/HCC)     GERD (gastroesophageal reflux disease)     Hyperlipidemia      Pertinent medical history also documented in my below narrative    Surgical History  Past Surgical History:   Procedure Laterality Date    FOOT SURGERY Left     HAND SURGERY Bilateral     LITHOTRIPSY      MANDIBLE SURGERY N/A       Pertinent surgical history also documented in my below narrative    Social History  He reports that he has never smoked. He has never used smokeless tobacco. He reports that he does " "not drink alcohol. No history on file for drug use.    Family History  Family History   Problem Relation Name Age of Onset    No Known Problems Father      No Known Problems Mother          Allergies  Patient has no known allergies.    Medications Prior to Admission   Medication Sig Dispense Refill Last Dose    atorvastatin (Lipitor) 10 mg tablet        ciprofloxacin (Cipro) 250 mg tablet Take 1 tablet (250 mg) by mouth 2 times a day. 6 tablet 0     glipiZIDE XL (Glucotrol XL) 2.5 mg 24 hr tablet Take 1 tablet (2.5 mg) by mouth once daily with breakfast.       magnesium oxide (Mag-Ox) 250 mg magnesium tablet        metFORMIN  mg 24 hr tablet Take 2 tablets (1,000 mg) by mouth 2 times a day with meals.       pantoprazole (ProtoNix) 40 mg EC tablet Take 1 tablet (40 mg) by mouth 2 times a day.       phenazopyridine (Pyridium) 200 mg tablet Take 1 tablet (200 mg) by mouth 3 times a day as needed for bladder spasms. 30 tablet 0     propranolol (Inderal) 10 mg tablet            Last Recorded Vitals  Blood pressure 156/87, pulse 99, temperature 36.5 °C (97.7 °F), temperature source Temporal, resp. rate 16, height 1.88 m (6' 2\"), weight 102 kg (225 lb), SpO2 94 %.    Physical Exam  Constitutional:       General: He is not in acute distress.     Appearance: He is not ill-appearing.      Comments: Awake alert and oriented x3   HENT:      Mouth/Throat:      Pharynx: Oropharynx is clear.   Eyes:      Pupils: Pupils are equal, round, and reactive to light.   Cardiovascular:      Rate and Rhythm: Normal rate and regular rhythm.      Heart sounds: Normal heart sounds.   Pulmonary:      Effort: No respiratory distress.      Breath sounds: Normal breath sounds. No wheezing or rhonchi.   Abdominal:      General: Abdomen is flat. Bowel sounds are normal. There is no distension.      Palpations: Abdomen is soft.      Tenderness: There is no abdominal tenderness.   Musculoskeletal:         General: No swelling.   Skin:     " General: Skin is warm.   Neurological:      General: No focal deficit present.   Psychiatric:         Mood and Affect: Mood normal.         Behavior: Behavior normal.         Thought Content: Thought content normal.         Judgment: Judgment normal.           Relevant Results  Results for orders placed or performed during the hospital encounter of 01/19/24 (from the past 24 hour(s))   Comprehensive metabolic panel   Result Value Ref Range    Glucose 414 (H) 74 - 99 mg/dL    Sodium 133 (L) 136 - 145 mmol/L    Potassium 5.9 (H) 3.5 - 5.3 mmol/L    Chloride 99 98 - 107 mmol/L    Bicarbonate 25 21 - 32 mmol/L    Anion Gap 15 10 - 20 mmol/L    Urea Nitrogen 24 (H) 6 - 23 mg/dL    Creatinine 1.32 (H) 0.50 - 1.30 mg/dL    eGFR 62 >60 mL/min/1.73m*2    Calcium 9.1 8.6 - 10.3 mg/dL    Albumin 3.7 3.4 - 5.0 g/dL    Alkaline Phosphatase 80 33 - 136 U/L    Total Protein 6.8 6.4 - 8.2 g/dL    AST 26 9 - 39 U/L    Bilirubin, Total 2.6 (H) 0.0 - 1.2 mg/dL    ALT 23 10 - 52 U/L   CBC   Result Value Ref Range    WBC 4.9 4.4 - 11.3 x10*3/uL    nRBC 0.0 0.0 - 0.0 /100 WBCs    RBC 4.05 (L) 4.50 - 5.90 x10*6/uL    Hemoglobin 13.2 (L) 13.5 - 17.5 g/dL    Hematocrit 37.5 (L) 41.0 - 52.0 %    MCV 93 80 - 100 fL    MCH 32.6 26.0 - 34.0 pg    MCHC 35.2 32.0 - 36.0 g/dL    RDW 13.3 11.5 - 14.5 %    Platelets 61 (L) 150 - 450 x10*3/uL   Troponin I, High Sensitivity   Result Value Ref Range    Troponin I, High Sensitivity 3 0 - 20 ng/L   Urinalysis with Reflex Culture and Microscopic   Result Value Ref Range    Color, Urine Karen (N) Straw, Yellow    Appearance, Urine Hazy (N) Clear    Specific Gravity, Urine 1.035 1.005 - 1.035    pH, Urine 5.0 5.0, 5.5, 6.0, 6.5, 7.0, 7.5, 8.0    Protein, Urine 100 (2+) (N) NEGATIVE mg/dL    Glucose, Urine >=500 (3+) (A) NEGATIVE mg/dL    Blood, Urine LARGE (3+) (A) NEGATIVE    Ketones, Urine 5 (TRACE) (A) NEGATIVE mg/dL    Bilirubin, Urine NEGATIVE NEGATIVE    Urobilinogen, Urine 2.0 (N) <2.0 mg/dL     Nitrite, Urine POSITIVE (A) NEGATIVE    Leukocyte Esterase, Urine NEGATIVE NEGATIVE   Microscopic Only, Urine   Result Value Ref Range    WBC, Urine NONE 1-5, NONE /HPF    RBC, Urine >20 (A) NONE, 1-2, 3-5 /HPF    Mucus, Urine 1+ Reference range not established. /LPF   POCT GLUCOSE   Result Value Ref Range    POCT Glucose 396 (H) 74 - 99 mg/dL        CT abdomen pelvis wo IV contrast    Result Date: 1/19/2024  Interpreted By:  Norm Cagle, STUDY: CT ABDOMEN PELVIS WO IV CONTRAST;  1/19/2024 7:46 pm   INDICATION: Signs/Symptoms:flank pain.   COMPARISON: 11/28/2023   ACCESSION NUMBER(S): [id number]   ORDERING CLINICIAN: DELANEY CASILLAS   TECHNIQUE: Contiguous axial images of the abdomen and pelvis were obtained without intravenous contrast. Coronal and sagittal reformatted images were reconstructed from the axial data.   FINDINGS: LOWER CHEST: There is scattered bibasilar atelectasis, greatest in the right middle lobe and left lower lobe. No pleural effusion.   Note: The absence of intravenous contrast limits evaluation of the solid organs and vasculature.   ABDOMEN/PELVIS:   ABDOMINAL WALL: Small fat-containing umbilical hernia.Small fat-containing left inguinal hernia.   LIVER: Cirrhotic liver morphology.   BILE DUCTS: There is unchanged dilatation of the common bile duct measuring 1 cm.   GALLBLADDER: Mild generalized wall edema relating to cirrhosis, unchanged. No other significant abnormality.   PANCREAS: No significant abnormality.   SPLEEN: Enlarged, measuring 18.8 cm in AP dimension, minimally decreased from prior study.   ADRENALS: No significant abnormality.   KIDNEYS, URETERS, BLADDER: There is a large, hyperdense subcapsular hematoma involving the left kidney. Its maximal thickness is approximately 4.6 cm. Its overall transaxial dimensions are 11.1 cm x 10.5 cm. It extends from the superior to inferior pole with craniocaudal length of 15.1 cm. There is a 3.6 cm x 2.7 cm area of blood products of lower  density involving the medial interpolar region (axial image 72/190, series 2; sagittal image 48/124, series 6) does not correlate to any renal cyst/lesion on prior study; therefore, this is concerning for non clotted blood products that could represent hyperacute non clotted blood products that can be seen with coagulopathy or active bleeding. There is severe compression of the left kidney secondary to the subcapsular hematoma. There is also moderate amount of hyperdense hemorrhage in the left perirenal space extending along the iliopsoas retroperitoneal space to the left pelvic sidewall. There has been interval fragmentation of previous left renal calculi, some of which have extended retrograde into calices and others of which extend into the renal pelvis. There is a left nephroureteral stent extending from the renal pelvis into the bladder. No ureteral calculus is seen along the course of the stent. The right kidney is normal in size without calculus or hydronephrosis. There is a small amount of gas in the urinary bladder lumen that may relate to the recent procedure. There is a new subcentimeter calculus in the artery bladder.   REPRODUCTIVE ORGANS: No significant abnormality.   VESSELS: Mild aortic atherosclerosis without AAA. Extensive esophageal varices, recanalized umbilical vein, dilated portal venous system and splenic vein due to portal hypertension, similar to prior study. There also perirenal varices along the renal veins. The aorta is mildly atherosclerotic without abdominal aortic aneurysm.   RETROPERITONEUM/LYMPH NODES: Similar prior study there are prominent, likely chronically reactive lymph nodes owing to cirrhosis in the nilson hepatis, periceliac region, and para-aortic and aortocaval locations. No acute retroperitoneal abnormality.   BOWEL/MESENTERY/PERITONEUM:  No inflammatory bowel wall thickening or dilatation. Stable mild dilatation of the distal appendix without inflammatory changes due to  intraluminal fecalith material. There is unchanged haziness in the central mesentery likely owing to mesenteric panniculitis. There is bilobed descending duodenal diverticulum without associated inflammation.   There is trace ascites. There is no free intraperitoneal air.     MUSCULOSKELETAL:  No acute osseous abnormality.       1. Large acute subcapsular hematoma involving the left kidney causing renal compression and measuring approximately 11.1 cm x 10.5 cm in transaxial dimension of uncertain craniocaudal dimension. An area that could represent non clotted blood products as seen with recent or active bleeding or coagulopathy is noted. Recommend correlation with hemoglobin/hematocrit level trending. Recommend consult with urology.   2. Interval fragmentation of previous left renal calculi without evidence of a ureteral calculus status post lithotripsy and stent placement extending from the renal pelvis to the urinary bladder. A single new subcentimeter bladder calculus is noted. No calculi along the course of the left ureteral stent except for those seen in the renal pelvis and calices.   3. Cirrhotic liver morphology with evidence of portal hypertension including gastroesophageal varices, recanalized umbilical vein, and splenomegaly.   4.   MACRO: Norm Cagle discussed the significance and urgency of this critical finding by telephone with  DELANEY CASILLAS on 1/19/2024 at 8:16 pm.  (**-RCF-**) Findings:  See findings.   Signed by: Norm Cagle 1/19/2024 8:18 PM Dictation workstation:   [id number]        Assessment/Plan     58-year-old male with past medical history of kidney stones, liver cirrhosis, hyperlipidemia, and diabetes mellitus who presented to the emergency room for worsening left-sided flank pain after having done on the same day an elective lithotripsy procedure with ureteral stent placement.  On presentation, blood workup showed elevated creatinine level, hyperkalemia, hyperglycemia. CT scan  "of the abdomen and pelvis showed \"Large acute subcapsular hematoma involving the left kidney causing renal compression and measuring approximately 11.1 cm x 10.5 cm.  Urinalysis came back positive for nitrites.    Admit patient to the inpatient medical service with telemetry and vital signs monitoring.  Consult urology  Give IV fluids normal saline at rate of 75 cc/hour.  Repeat CBC and BMP in the morning.  Continue with ceftriaxone 1 g IV daily and follow-up with the urine culture final report  Continue home medication except for the metformin.  SCDs for DVT prophylaxis  Full code    (This note was generated with voice recognition software and may contain errors including spelling, grammar, syntax and misrecognition of what was dictated, that are not fully corrected)     Damian Hoover MD   "

## 2024-01-21 ENCOUNTER — APPOINTMENT (OUTPATIENT)
Dept: RADIOLOGY | Facility: HOSPITAL | Age: 59
DRG: 908 | End: 2024-01-21
Payer: COMMERCIAL

## 2024-01-21 VITALS
TEMPERATURE: 97.7 F | WEIGHT: 225 LBS | HEART RATE: 106 BPM | DIASTOLIC BLOOD PRESSURE: 80 MMHG | HEIGHT: 74 IN | BODY MASS INDEX: 28.88 KG/M2 | OXYGEN SATURATION: 93 % | RESPIRATION RATE: 18 BRPM | SYSTOLIC BLOOD PRESSURE: 127 MMHG

## 2024-01-21 LAB
ANION GAP SERPL CALC-SCNC: 10 MMOL/L (ref 10–20)
BACTERIA UR CULT: NO GROWTH
BUN SERPL-MCNC: 19 MG/DL (ref 6–23)
CALCIUM SERPL-MCNC: 8.2 MG/DL (ref 8.6–10.3)
CHLORIDE SERPL-SCNC: 101 MMOL/L (ref 98–107)
CO2 SERPL-SCNC: 27 MMOL/L (ref 21–32)
CREAT SERPL-MCNC: 1.19 MG/DL (ref 0.5–1.3)
EGFRCR SERPLBLD CKD-EPI 2021: 70 ML/MIN/1.73M*2
ERYTHROCYTE [DISTWIDTH] IN BLOOD BY AUTOMATED COUNT: 13.8 % (ref 11.5–14.5)
GLUCOSE BLD MANUAL STRIP-MCNC: 214 MG/DL (ref 74–99)
GLUCOSE BLD MANUAL STRIP-MCNC: 259 MG/DL (ref 74–99)
GLUCOSE SERPL-MCNC: 235 MG/DL (ref 74–99)
HCT VFR BLD AUTO: 28.3 % (ref 41–52)
HCT VFR BLD AUTO: 30 % (ref 41–52)
HEMOCCULT STL QL IA: NEGATIVE
HGB BLD-MCNC: 10.4 G/DL (ref 13.5–17.5)
HGB BLD-MCNC: 9.9 G/DL (ref 13.5–17.5)
HOLD SPECIMEN: NORMAL
MCH RBC QN AUTO: 32.6 PG (ref 26–34)
MCHC RBC AUTO-ENTMCNC: 34.7 G/DL (ref 32–36)
MCV RBC AUTO: 94 FL (ref 80–100)
NRBC BLD-RTO: 0 /100 WBCS (ref 0–0)
PLATELET # BLD AUTO: 56 X10*3/UL (ref 150–450)
POTASSIUM SERPL-SCNC: 4.2 MMOL/L (ref 3.5–5.3)
RBC # BLD AUTO: 3.19 X10*6/UL (ref 4.5–5.9)
SODIUM SERPL-SCNC: 134 MMOL/L (ref 136–145)
WBC # BLD AUTO: 5.8 X10*3/UL (ref 4.4–11.3)

## 2024-01-21 PROCEDURE — 85014 HEMATOCRIT: CPT

## 2024-01-21 PROCEDURE — 36415 COLL VENOUS BLD VENIPUNCTURE: CPT

## 2024-01-21 PROCEDURE — 2500000001 HC RX 250 WO HCPCS SELF ADMINISTERED DRUGS (ALT 637 FOR MEDICARE OP): Performed by: INTERNAL MEDICINE

## 2024-01-21 PROCEDURE — 2500000001 HC RX 250 WO HCPCS SELF ADMINISTERED DRUGS (ALT 637 FOR MEDICARE OP)

## 2024-01-21 PROCEDURE — 74176 CT ABD & PELVIS W/O CONTRAST: CPT

## 2024-01-21 PROCEDURE — 82274 ASSAY TEST FOR BLOOD FECAL: CPT

## 2024-01-21 PROCEDURE — 74176 CT ABD & PELVIS W/O CONTRAST: CPT | Performed by: RADIOLOGY

## 2024-01-21 PROCEDURE — 80048 BASIC METABOLIC PNL TOTAL CA: CPT

## 2024-01-21 PROCEDURE — 85027 COMPLETE CBC AUTOMATED: CPT

## 2024-01-21 PROCEDURE — 82947 ASSAY GLUCOSE BLOOD QUANT: CPT

## 2024-01-21 PROCEDURE — 99231 SBSQ HOSP IP/OBS SF/LOW 25: CPT

## 2024-01-21 PROCEDURE — 2500000004 HC RX 250 GENERAL PHARMACY W/ HCPCS (ALT 636 FOR OP/ED): Performed by: INTERNAL MEDICINE

## 2024-01-21 RX ORDER — GLIPIZIDE 5 MG/1
5 TABLET, FILM COATED, EXTENDED RELEASE ORAL
Status: DISCONTINUED | OUTPATIENT
Start: 2024-01-21 | End: 2024-01-21 | Stop reason: HOSPADM

## 2024-01-21 RX ORDER — GLIPIZIDE 5 MG/1
5 TABLET, FILM COATED, EXTENDED RELEASE ORAL
Start: 2024-01-22

## 2024-01-21 RX ADMIN — MAGNESIUM HYDROXIDE 10 ML: 2400 SUSPENSION ORAL at 13:08

## 2024-01-21 RX ADMIN — INSULIN LISPRO 3.55 UNITS: 100 INJECTION, SOLUTION INTRAVENOUS; SUBCUTANEOUS at 13:08

## 2024-01-21 RX ADMIN — GLIPIZIDE 5 MG: 5 TABLET, EXTENDED RELEASE ORAL at 09:25

## 2024-01-21 RX ADMIN — INSULIN LISPRO 3.95 UNITS: 100 INJECTION, SOLUTION INTRAVENOUS; SUBCUTANEOUS at 09:25

## 2024-01-21 RX ADMIN — PANTOPRAZOLE SODIUM 40 MG: 40 TABLET, DELAYED RELEASE ORAL at 09:25

## 2024-01-21 ASSESSMENT — COGNITIVE AND FUNCTIONAL STATUS - GENERAL
MOBILITY SCORE: 24
DAILY ACTIVITIY SCORE: 24

## 2024-01-21 ASSESSMENT — PAIN SCALES - GENERAL: PAINLEVEL_OUTOF10: 0 - NO PAIN

## 2024-01-21 ASSESSMENT — PAIN - FUNCTIONAL ASSESSMENT: PAIN_FUNCTIONAL_ASSESSMENT: 0-10

## 2024-01-21 NOTE — CARE PLAN
The patient's goals for the shift include  go home    The clinical goals for the shift include pain control, hgb wdl, lessen abd tenderness

## 2024-01-21 NOTE — DISCHARGE SUMMARY
Discharge Diagnosis  Closed hematoma of left kidney, initial encounter    Issues Requiring Follow-Up  Closed hematoma of left kidney    Discharge Meds     Your medication list        CHANGE how you take these medications        Instructions Last Dose Given Next Dose Due   glipiZIDE XL 5 mg 24 hr tablet  Commonly known as: Glucotrol XL  Start taking on: January 22, 2024  What changed:   medication strength  how much to take  additional instructions      Take 1 tablet (5 mg) by mouth once daily with breakfast. Do not crush, chew, or split. Do not start before January 22, 2024.              CONTINUE taking these medications        Instructions Last Dose Given Next Dose Due   atorvastatin 20 mg tablet  Commonly known as: Lipitor           ciprofloxacin 250 mg tablet  Commonly known as: Cipro      Take 1 tablet (250 mg) by mouth 2 times a day.       magnesium oxide 250 mg magnesium tablet  Commonly known as: Mag-Ox           metFORMIN  mg 24 hr tablet  Commonly known as: Glucophage-XR           pantoprazole 40 mg EC tablet  Commonly known as: ProtoNix           phenazopyridine 200 mg tablet  Commonly known as: Pyridium      Take 1 tablet (200 mg) by mouth 3 times a day as needed for bladder spasms.       propranolol 10 mg tablet  Commonly known as: Inderal           Vitamin D3 50 mcg (2,000 unit) capsule  Generic drug: cholecalciferol                     Where to Get Your Medications        Information about where to get these medications is not yet available    Ask your nurse or doctor about these medications  glipiZIDE XL 5 mg 24 hr tablet         Test Results Pending At Discharge  Pending Labs       Order Current Status    Extra Tubes In process    Green Top In process    Lavender Top In process    Lavender Top In process    SST TOP In process            Hospital Course   Tj Miranda is a 58 y.o. male  Who presented to the emergency room for left-sided flank pain and nausea after having earlier in the day an  "elective lithotripsy procedure with ureteral stent placement.  On presentation, vital signs grossly within normal limits.  Pertinent findings on blood workup; glucose 414, sodium 133, potassium 5.9, creatinine 1.32 and bilirubin 2.6.  Urinalysis came back positive for nitrite and blood.  CT scan of the abdomen and pelvis showed \"Large acute subcapsular hematoma involving the left kidney causing renal compression and measuring approximately 11.1 cm x 10.5 cm.  There is also cirrhotic liver morphology.\"  ER physician reached out to the patient's urologist and the latter recommended admission to the hospital.  Patient was given in the ER 1 g IV ceftriaxone, 6 mg IV dexamethasone, IV fluids, Dilaudid, and insulin.  Patient was then admitted to the medical service for further investigation and management.  Patient resting comfortably in his bed now.  He said that after the procedure, he went back home.  Once he reached home, he started experiencing progressively worsening pain in the left flank area associated with severe nausea.  He did not vomit.  Did not have any fever or chills.    Patient had repeat CT abdomen and pelvis performed and showed slight decrease in fairly large left renal subscapular hematoma.  Patient verbalizes decrease in left lateral and flank pain and verbalizes hematuria has resolved.  Hemoglobin continues to decrease.  Hemoglobin today decreased to 10.4 from 12 drawn on 1/20.  Repeat H&H done at 3 PM and returned at 9.9 and 28.3.  Stool sent for occult and was negative for blood.  Patient and wife asking for discharge home.  Spoke with Dr. Chacon on 1/20 who recommended if H&H stable to discharge home and follow-up in office in a couple weeks.  Patient in fair condition and discharged home.  Lab ordered for repeat CBC on Monday 1/22 and Tuesday 1/23.  Patient to resume home meds.  Patient to follow-up with Dr. Chacon in 3 days.  Patient to follow-up with PCP in 1 week    Pertinent Physical Exam At " Time of Discharge  Physical Exam  General Appearance: AAO x 3, not in acute distress  Skin: skin color pink, warm, and dry; no suspicious rashes or lesions  Eyes : PERRL, EOM's intact  ENT: mucous membranes pink and moist  Neck: normocephalic  Respiratory: lungs clear to auscultation anteriorly; no wheezing, rhonchi, or crackles.   Heart: regular rate and rhythm.   Abdomen: Nondistended, positive bowel sounds x4, soft, tenderness to left lateral abdomen  Extremities: no edema   Peripheral pulses: normal x4 extremities  Neuro: alert, coherent and conversant, no focal motor deficits  Outpatient Follow-Up  No future appointments.      Miracle Sanchez, APRN-CNP

## 2024-01-21 NOTE — DISCHARGE INSTRUCTIONS
Discharge:    Discharge home  Resume home meds  Follow-up with PCP in 1 week  Follow-up with Dr. Chacon in 3 days  Ordered outpatient CBC for Monday, January 22) dated January 23.    Thank you for allowing  [facility] to participate in your care. Return to the ER  if symptoms worsen

## 2024-01-22 ENCOUNTER — PATIENT OUTREACH (OUTPATIENT)
Dept: CARE COORDINATION | Facility: CLINIC | Age: 59
End: 2024-01-22

## 2024-01-22 ENCOUNTER — LAB (OUTPATIENT)
Dept: LAB | Facility: LAB | Age: 59
End: 2024-01-22
Payer: COMMERCIAL

## 2024-01-22 DIAGNOSIS — S37.012A: ICD-10-CM

## 2024-01-22 LAB
ERYTHROCYTE [DISTWIDTH] IN BLOOD BY AUTOMATED COUNT: 13.6 % (ref 11.5–14.5)
HCT VFR BLD AUTO: 30.7 % (ref 41–52)
HGB BLD-MCNC: 10.7 G/DL (ref 13.5–17.5)
MCH RBC QN AUTO: 33 PG (ref 26–34)
MCHC RBC AUTO-ENTMCNC: 34.9 G/DL (ref 32–36)
MCV RBC AUTO: 95 FL (ref 80–100)
NRBC BLD-RTO: 0 /100 WBCS (ref 0–0)
PLATELET # BLD AUTO: 60 X10*3/UL (ref 150–450)
RBC # BLD AUTO: 3.24 X10*6/UL (ref 4.5–5.9)
WBC # BLD AUTO: 6 X10*3/UL (ref 4.4–11.3)

## 2024-01-22 PROCEDURE — 36415 COLL VENOUS BLD VENIPUNCTURE: CPT

## 2024-01-22 PROCEDURE — 85027 COMPLETE CBC AUTOMATED: CPT

## 2024-01-22 NOTE — PROGRESS NOTES
"[facility] [place]  Admitted 1/19/2024  Discharged 1/21/2024   Dx: Subcapsular Hematoma of the left kidney causing renal compression    Outreach call to patient/Michelle (wife) to support a smooth transition of care from recent admission.  Spoke with Michelle reviewed discharge instructions, assessed social needs, and provided education on importance of follow-up appointment with provider.  Will continue to monitor through transition period.    Michelle states, Tj is still having pain on the left side. Michelle states, it is not as bad as when he was admitted to the hospital but the pain is \"still rough.\"  Patient is drinking fluids and eating. Michelle states, he was not prescribed any pain medications at discharge. Patient is currently taking Tylenol for pain control.    Michelle states, she has made an outreach call to the Urologist that performed the surgery and is waiting for a return call.    Engagement  Call Start Time: 1509 (1/22/2024  3:09 PM)    Medications  Medications reviewed with patient/caregiver?: Not applicable (1/22/2024  3:09 PM)  Does the patient have all medications ordered at discharge?: Not applicable (1/22/2024  3:09 PM)  Care Management Interventions: No intervention needed (1/22/2024  3:09 PM)  Prescription Comments: Patient was not discharged on any new medications (1/22/2024  3:09 PM)  Medication Comments: Patient takiny Tylenol for pain control (1/22/2024  3:09 PM)    Appointments  Does the patient have a primary care provider?: Yes (7/5/2024) (1/22/2024  3:09 PM)  Care Management Interventions: -- (Patient is waiting for a retun call from the Urologist to schedule a follow up appointment) (1/22/2024  3:09 PM)    Self Management  What is the home health agency?: not applicable (1/22/2024  3:09 PM)  What Durable Medical Equipment (DME) was ordered?: not applicable (1/22/2024  3:09 PM)    Patient Teaching  Does the patient have access to their discharge instructions?: Yes (1/22/2024  3:09 " PM)  Care Management Interventions: Reviewed instructions with patient (1/22/2024  3:09 PM)  What is the patient's perception of their health status since discharge?: Improving (1/22/2024  3:09 PM)  Is the patient/caregiver able to teach back the hierarchy of who to call/visit for symptoms/problems? PCP, Specialist, Home Health nurse, Urgent Care, ED, 911: Yes (1/22/2024  3:09 PM)    Tonya Cruz RN/OMAR  [facility] [place] [facility]  [phone]

## 2024-01-23 ENCOUNTER — LAB (OUTPATIENT)
Dept: LAB | Facility: LAB | Age: 59
End: 2024-01-23
Payer: COMMERCIAL

## 2024-01-23 DIAGNOSIS — S37.012A: ICD-10-CM

## 2024-01-23 LAB
ERYTHROCYTE [DISTWIDTH] IN BLOOD BY AUTOMATED COUNT: 13.8 % (ref 11.5–14.5)
HCT VFR BLD AUTO: 32.1 % (ref 41–52)
HGB BLD-MCNC: 11.1 G/DL (ref 13.5–17.5)
MCH RBC QN AUTO: 33 PG (ref 26–34)
MCHC RBC AUTO-ENTMCNC: 34.6 G/DL (ref 32–36)
MCV RBC AUTO: 96 FL (ref 80–100)
NRBC BLD-RTO: 0 /100 WBCS (ref 0–0)
PLATELET # BLD AUTO: 73 X10*3/UL (ref 150–450)
RBC # BLD AUTO: 3.36 X10*6/UL (ref 4.5–5.9)
WBC # BLD AUTO: 5.2 X10*3/UL (ref 4.4–11.3)

## 2024-01-23 PROCEDURE — 36415 COLL VENOUS BLD VENIPUNCTURE: CPT

## 2024-01-23 PROCEDURE — 85027 COMPLETE CBC AUTOMATED: CPT

## 2024-01-23 ASSESSMENT — ENCOUNTER SYMPTOMS
ALLERGIC/IMMUNOLOGIC NEGATIVE: 1
FEVER: 0
COUGH: 0
NAUSEA: 0
CHILLS: 0
EYES NEGATIVE: 1
SHORTNESS OF BREATH: 0
ENDOCRINE NEGATIVE: 1
DIFFICULTY URINATING: 0
PSYCHIATRIC NEGATIVE: 1

## 2024-01-23 NOTE — PROGRESS NOTES
Subjective   Patient ID: Tj Miranda is a 58 y.o. male.    HPI  Patient is here for post op issues. S/P Left ESWL with ureteroscopy and stent change on 1/19. He was then in ER later that day for severe pain. CT was done that showed Hematoma. Repeat CT was done on 1/21 that showed slight decrease in fairly large hematoma. Hemoglobin is 11.1 (1/23/24) 10.7 (1/22/24)      Review of Systems   Constitutional:  Negative for chills and fever.   HENT: Negative.     Eyes: Negative.    Respiratory:  Negative for cough and shortness of breath.    Cardiovascular:  Negative for chest pain and leg swelling.   Gastrointestinal:  Negative for nausea.   Endocrine: Negative.    Genitourinary:  Negative for difficulty urinating.        Negative except for documented in HPI   Allergic/Immunologic: Negative.    Neurological:         Alert & oriented X 3   Hematological:         Denies blood thinners   Psychiatric/Behavioral: Negative.         Objective   Physical Exam  Vitals and nursing note reviewed.   Pulmonary:      Effort: Pulmonary effort is normal.      Breath sounds: Normal breath sounds.   Abdominal:      Palpations: Abdomen is soft.      Tenderness: There is no abdominal tenderness.   Genitourinary:     Comments: Kidneys non palpable bilaterally  Bladder non palpable or tender  Neurological:      Mental Status: He is alert.         Assessment/Plan   Diagnoses and all orders for this visit:  Kidney stones  Nocturia  Hematoma of left kidney, subsequent encounter      CT x 2 reviewed  KUB ordered  H/H reviewed  F/U 2 weeks with KUB-Will plan stent pull as patient does not want additional procedure to be done    F/U 2 weeks with KUB

## 2024-01-24 ENCOUNTER — OFFICE VISIT (OUTPATIENT)
Dept: UROLOGY | Facility: CLINIC | Age: 59
End: 2024-01-24
Payer: COMMERCIAL

## 2024-01-24 VITALS — WEIGHT: 225 LBS | BODY MASS INDEX: 28.89 KG/M2 | RESPIRATION RATE: 16 BRPM

## 2024-01-24 DIAGNOSIS — N20.0 KIDNEY STONES: Primary | ICD-10-CM

## 2024-01-24 DIAGNOSIS — S37.012D HEMATOMA OF LEFT KIDNEY, SUBSEQUENT ENCOUNTER: ICD-10-CM

## 2024-01-24 DIAGNOSIS — R35.1 NOCTURIA: ICD-10-CM

## 2024-01-24 PROBLEM — S37.019A RENAL HEMATOMA: Status: ACTIVE | Noted: 2024-01-19

## 2024-01-24 PROCEDURE — 99024 POSTOP FOLLOW-UP VISIT: CPT | Performed by: UROLOGY

## 2024-01-24 PROCEDURE — 1036F TOBACCO NON-USER: CPT | Performed by: UROLOGY

## 2024-01-29 ENCOUNTER — APPOINTMENT (OUTPATIENT)
Dept: UROLOGY | Facility: CLINIC | Age: 59
End: 2024-01-29
Payer: COMMERCIAL

## 2024-01-31 NOTE — PROGRESS NOTES
Patient ID: jT Miranda is a 58 y.o. male.      HPI  Patient is here for post op issues. S/P Left ESWL with ureteroscopy and stent change on 1/19. He was seen in the ER after at which times a CT was done that showed Hematoma. Repeat CT was done on 1/21 that showed slight decrease in fairly large hematoma. Hemoglobin is 11.1 (1/23/24) 10.7 (1/22/24)   PSA 0.52 11/17/2023

## 2024-02-02 ENCOUNTER — PATIENT OUTREACH (OUTPATIENT)
Dept: CARE COORDINATION | Facility: CLINIC | Age: 59
End: 2024-02-02
Payer: COMMERCIAL

## 2024-02-02 NOTE — PROGRESS NOTES
Outreach call to patient/Michelle following up on appointment with urologist.  Left voicemail message with CM name and contact number.   Will continue to follow.      Tonya Cruz RN/OMAR  [facility]  [phone]

## 2024-02-07 ENCOUNTER — PROCEDURE VISIT (OUTPATIENT)
Dept: UROLOGY | Facility: CLINIC | Age: 59
End: 2024-02-07
Payer: COMMERCIAL

## 2024-02-07 ENCOUNTER — HOSPITAL ENCOUNTER (OUTPATIENT)
Dept: RADIOLOGY | Facility: CLINIC | Age: 59
Discharge: HOME | End: 2024-02-07
Payer: COMMERCIAL

## 2024-02-07 DIAGNOSIS — N20.1 LEFT URETERAL STONE: Primary | ICD-10-CM

## 2024-02-07 DIAGNOSIS — N20.0 KIDNEY STONES: ICD-10-CM

## 2024-02-07 PROCEDURE — 74018 RADEX ABDOMEN 1 VIEW: CPT

## 2024-02-07 RX ORDER — CIPROFLOXACIN 250 MG/1
250 TABLET, FILM COATED ORAL 2 TIMES DAILY
Qty: 6 TABLET | Refills: 0 | Status: CANCELLED | OUTPATIENT
Start: 2024-02-07 | End: 2024-02-10

## 2024-03-01 ENCOUNTER — PATIENT OUTREACH (OUTPATIENT)
Dept: CARE COORDINATION | Facility: CLINIC | Age: 59
End: 2024-03-01
Payer: COMMERCIAL

## 2024-03-01 NOTE — PROGRESS NOTES
Outreach call to patient to check in 30 days after hospital discharge to support smooth transition of care.  Spoke with patient's wife Michelle, who states things are not going so good. Michelle ulrich, they saw Dr. Chacon on 2/7/2024 and they were unable to remove the stent because there is a stone sitting on top of the stent. Michelle states, she was told the would have to go to [facility] to see a Urologist for stent removal.  Michelle states, she has not received any calls from anyone regarding scheduling an appointment for a Urologist. Michelle states, she did outreach to Dr. Chacon's office but has not received a return call. Michelle states, that she is going to call Dr. Chacon's office again in regards to a referral for a Urologist at [facility].    CM contacted Dr. Paulino office, left a message on the nurse line explaining the situation and provide my name and contact number and the patient's name and contact number to assist with getting the Urology referral for [facility].    Tonya Cruz RN/CM   [facility]  [phone]

## 2024-03-04 DIAGNOSIS — N20.0 KIDNEY STONES: ICD-10-CM

## 2024-03-05 ASSESSMENT — ENCOUNTER SYMPTOMS
SHORTNESS OF BREATH: 0
EYES NEGATIVE: 1
ALLERGIC/IMMUNOLOGIC NEGATIVE: 1
NAUSEA: 0
ENDOCRINE NEGATIVE: 1
FEVER: 0
CHILLS: 0
COUGH: 0
PSYCHIATRIC NEGATIVE: 1
DIFFICULTY URINATING: 0

## 2024-03-05 NOTE — H&P (VIEW-ONLY)
Subjective   Patient ID: Tj Miranda is a 58 y.o. male.    HPI  Patient is here for KUB results. Hx of kidney stones. S/P Left ESWL with ureteroscopy and stent change on 1/19. Most recent PSA was 11.1 on 1/23/23. Prior PSA was 10.7 on 1/22/24.       Review of Systems   Constitutional:  Negative for chills and fever.   HENT: Negative.     Eyes: Negative.    Respiratory:  Negative for cough and shortness of breath.    Cardiovascular:  Negative for chest pain and leg swelling.   Gastrointestinal:  Negative for nausea.   Endocrine: Negative.    Genitourinary:  Negative for difficulty urinating.        Negative except for documented in HPI   Allergic/Immunologic: Negative.    Neurological:         Alert & oriented X 3   Hematological:         Denies blood thinners   Psychiatric/Behavioral: Negative.         Objective   Physical Exam  Vitals and nursing note reviewed.   Pulmonary:      Effort: Pulmonary effort is normal.      Breath sounds: Normal breath sounds.   Abdominal:      Palpations: Abdomen is soft.      Tenderness: There is no abdominal tenderness.   Genitourinary:     Comments: Kidneys non palpable bilaterally  Bladder non palpable or tender  Neurological:      Mental Status: He is alert.         Assessment/Plan   Diagnoses and all orders for this visit:  Left ureteral stone  Kidney stones  Nocturia      KUB reviewed today-Definite improvement and significant decreased stone burden  Will do LEFT FLEXIBLE ureteroscopy with laser and possible stent removal  Will not repeat ESWL due to hematoma after last visit

## 2024-03-07 ENCOUNTER — OFFICE VISIT (OUTPATIENT)
Dept: UROLOGY | Facility: CLINIC | Age: 59
End: 2024-03-07
Payer: COMMERCIAL

## 2024-03-07 ENCOUNTER — HOSPITAL ENCOUNTER (OUTPATIENT)
Dept: RADIOLOGY | Facility: CLINIC | Age: 59
Discharge: HOME | End: 2024-03-07
Payer: COMMERCIAL

## 2024-03-07 VITALS — BODY MASS INDEX: 28.63 KG/M2 | WEIGHT: 216 LBS | HEIGHT: 73 IN | RESPIRATION RATE: 16 BRPM

## 2024-03-07 DIAGNOSIS — R35.1 NOCTURIA: ICD-10-CM

## 2024-03-07 DIAGNOSIS — N20.0 KIDNEY STONES: ICD-10-CM

## 2024-03-07 DIAGNOSIS — N20.1 LEFT URETERAL STONE: ICD-10-CM

## 2024-03-07 PROCEDURE — 99024 POSTOP FOLLOW-UP VISIT: CPT | Performed by: UROLOGY

## 2024-03-07 PROCEDURE — 1036F TOBACCO NON-USER: CPT | Performed by: UROLOGY

## 2024-03-07 PROCEDURE — 74018 RADEX ABDOMEN 1 VIEW: CPT

## 2024-03-11 ENCOUNTER — PREP FOR PROCEDURE (OUTPATIENT)
Dept: UROLOGY | Facility: CLINIC | Age: 59
End: 2024-03-11
Payer: COMMERCIAL

## 2024-03-11 DIAGNOSIS — N20.0 KIDNEY STONE: ICD-10-CM

## 2024-03-14 NOTE — PREPROCEDURE INSTRUCTIONS
No outpatient medications have been marked as taking for the 3/19/24 encounter (Hospital Encounter).                       NPO Instructions:    Nothing to eat or drink after midnight    Additional Instructions:     Will need  home, will receive call day before surgery with arrival time

## 2024-03-19 ENCOUNTER — ANESTHESIA EVENT (OUTPATIENT)
Dept: OPERATING ROOM | Facility: HOSPITAL | Age: 59
End: 2024-03-19
Payer: COMMERCIAL

## 2024-03-19 ENCOUNTER — APPOINTMENT (OUTPATIENT)
Dept: RADIOLOGY | Facility: HOSPITAL | Age: 59
End: 2024-03-19
Payer: COMMERCIAL

## 2024-03-19 ENCOUNTER — ANESTHESIA (OUTPATIENT)
Dept: OPERATING ROOM | Facility: HOSPITAL | Age: 59
End: 2024-03-19
Payer: COMMERCIAL

## 2024-03-19 ENCOUNTER — HOSPITAL ENCOUNTER (OUTPATIENT)
Facility: HOSPITAL | Age: 59
Setting detail: OUTPATIENT SURGERY
Discharge: HOME | End: 2024-03-19
Attending: UROLOGY | Admitting: UROLOGY
Payer: COMMERCIAL

## 2024-03-19 VITALS
TEMPERATURE: 98 F | SYSTOLIC BLOOD PRESSURE: 149 MMHG | HEIGHT: 74 IN | WEIGHT: 218.03 LBS | DIASTOLIC BLOOD PRESSURE: 86 MMHG | RESPIRATION RATE: 16 BRPM | OXYGEN SATURATION: 99 % | HEART RATE: 80 BPM | BODY MASS INDEX: 27.98 KG/M2

## 2024-03-19 DIAGNOSIS — N20.0 KIDNEY STONE: Primary | ICD-10-CM

## 2024-03-19 LAB — GLUCOSE BLD MANUAL STRIP-MCNC: 107 MG/DL (ref 74–99)

## 2024-03-19 PROCEDURE — 3600000003 HC OR TIME - INITIAL BASE CHARGE - PROCEDURE LEVEL THREE: Performed by: UROLOGY

## 2024-03-19 PROCEDURE — 52356 CYSTO/URETERO W/LITHOTRIPSY: CPT | Performed by: UROLOGY

## 2024-03-19 PROCEDURE — C1769 GUIDE WIRE: HCPCS | Performed by: UROLOGY

## 2024-03-19 PROCEDURE — 82947 ASSAY GLUCOSE BLOOD QUANT: CPT

## 2024-03-19 PROCEDURE — 7100000009 HC PHASE TWO TIME - INITIAL BASE CHARGE: Performed by: UROLOGY

## 2024-03-19 PROCEDURE — 7100000002 HC RECOVERY ROOM TIME - EACH INCREMENTAL 1 MINUTE: Performed by: UROLOGY

## 2024-03-19 PROCEDURE — 3700000001 HC GENERAL ANESTHESIA TIME - INITIAL BASE CHARGE: Performed by: UROLOGY

## 2024-03-19 PROCEDURE — 2550000001 HC RX 255 CONTRASTS: Performed by: UROLOGY

## 2024-03-19 PROCEDURE — 2500000004 HC RX 250 GENERAL PHARMACY W/ HCPCS (ALT 636 FOR OP/ED): Performed by: ANESTHESIOLOGY

## 2024-03-19 PROCEDURE — 3600000008 HC OR TIME - EACH INCREMENTAL 1 MINUTE - PROCEDURE LEVEL THREE: Performed by: UROLOGY

## 2024-03-19 PROCEDURE — 74420 UROGRAPHY RTRGR +-KUB: CPT | Performed by: UROLOGY

## 2024-03-19 PROCEDURE — 2720000007 HC OR 272 NO HCPCS: Performed by: UROLOGY

## 2024-03-19 PROCEDURE — 7100000001 HC RECOVERY ROOM TIME - INITIAL BASE CHARGE: Performed by: UROLOGY

## 2024-03-19 PROCEDURE — 3700000002 HC GENERAL ANESTHESIA TIME - EACH INCREMENTAL 1 MINUTE: Performed by: UROLOGY

## 2024-03-19 PROCEDURE — 2500000005 HC RX 250 GENERAL PHARMACY W/O HCPCS: Performed by: ANESTHESIOLOGY

## 2024-03-19 PROCEDURE — 7100000010 HC PHASE TWO TIME - EACH INCREMENTAL 1 MINUTE: Performed by: UROLOGY

## 2024-03-19 RX ORDER — SODIUM CHLORIDE, SODIUM LACTATE, POTASSIUM CHLORIDE, CALCIUM CHLORIDE 600; 310; 30; 20 MG/100ML; MG/100ML; MG/100ML; MG/100ML
100 INJECTION, SOLUTION INTRAVENOUS CONTINUOUS
Status: DISCONTINUED | OUTPATIENT
Start: 2024-03-19 | End: 2024-03-19 | Stop reason: HOSPADM

## 2024-03-19 RX ORDER — MIDAZOLAM HYDROCHLORIDE 1 MG/ML
2 INJECTION INTRAMUSCULAR; INTRAVENOUS ONCE AS NEEDED
Status: DISCONTINUED | OUTPATIENT
Start: 2024-03-19 | End: 2024-03-19 | Stop reason: HOSPADM

## 2024-03-19 RX ORDER — LIDOCAINE HYDROCHLORIDE 10 MG/ML
INJECTION, SOLUTION EPIDURAL; INFILTRATION; INTRACAUDAL; PERINEURAL AS NEEDED
Status: DISCONTINUED | OUTPATIENT
Start: 2024-03-19 | End: 2024-03-19

## 2024-03-19 RX ORDER — PROPOFOL 10 MG/ML
INJECTION, EMULSION INTRAVENOUS AS NEEDED
Status: DISCONTINUED | OUTPATIENT
Start: 2024-03-19 | End: 2024-03-19

## 2024-03-19 RX ORDER — CIPROFLOXACIN 500 MG/1
250 TABLET ORAL 2 TIMES DAILY
Qty: 3 TABLET | Refills: 0 | Status: SHIPPED | OUTPATIENT
Start: 2024-03-19 | End: 2024-03-22

## 2024-03-19 RX ORDER — PHENAZOPYRIDINE HYDROCHLORIDE 200 MG/1
200 TABLET, FILM COATED ORAL 3 TIMES DAILY PRN
Qty: 30 TABLET | Refills: 0 | Status: SHIPPED | OUTPATIENT
Start: 2024-03-19

## 2024-03-19 RX ORDER — ONDANSETRON HYDROCHLORIDE 2 MG/ML
4 INJECTION, SOLUTION INTRAVENOUS ONCE
Status: COMPLETED | OUTPATIENT
Start: 2024-03-19 | End: 2024-03-19

## 2024-03-19 RX ORDER — HYDROCODONE BITARTRATE AND ACETAMINOPHEN 5; 325 MG/1; MG/1
1 TABLET ORAL EVERY 6 HOURS PRN
Qty: 20 TABLET | Refills: 0 | Status: SHIPPED | OUTPATIENT
Start: 2024-03-19

## 2024-03-19 RX ORDER — DEXAMETHASONE SODIUM PHOSPHATE 10 MG/ML
5 INJECTION INTRAMUSCULAR; INTRAVENOUS ONCE
Status: COMPLETED | OUTPATIENT
Start: 2024-03-19 | End: 2024-03-19

## 2024-03-19 RX ORDER — FENTANYL CITRATE 50 UG/ML
INJECTION, SOLUTION INTRAMUSCULAR; INTRAVENOUS AS NEEDED
Status: DISCONTINUED | OUTPATIENT
Start: 2024-03-19 | End: 2024-03-19

## 2024-03-19 RX ORDER — OXYCODONE HYDROCHLORIDE 5 MG/1
5 TABLET ORAL EVERY 4 HOURS PRN
Status: DISCONTINUED | OUTPATIENT
Start: 2024-03-19 | End: 2024-03-19 | Stop reason: HOSPADM

## 2024-03-19 RX ADMIN — LIDOCAINE HYDROCHLORIDE 2 ML: 10 INJECTION, SOLUTION EPIDURAL; INFILTRATION; INTRACAUDAL; PERINEURAL at 13:12

## 2024-03-19 RX ADMIN — PROPOFOL 150 MG: 10 INJECTION, EMULSION INTRAVENOUS at 13:12

## 2024-03-19 RX ADMIN — SODIUM CHLORIDE, POTASSIUM CHLORIDE, SODIUM LACTATE AND CALCIUM CHLORIDE 100 ML/HR: 600; 310; 30; 20 INJECTION, SOLUTION INTRAVENOUS at 11:45

## 2024-03-19 RX ADMIN — FENTANYL CITRATE 100 MCG: 50 INJECTION, SOLUTION INTRAMUSCULAR; INTRAVENOUS at 13:12

## 2024-03-19 RX ADMIN — DEXAMETHASONE SODIUM PHOSPHATE 5 MG: 10 INJECTION INTRAMUSCULAR; INTRAVENOUS at 12:12

## 2024-03-19 RX ADMIN — ONDANSETRON 4 MG: 2 INJECTION INTRAMUSCULAR; INTRAVENOUS at 11:54

## 2024-03-19 RX ADMIN — PROPOFOL 50 MG: 10 INJECTION, EMULSION INTRAVENOUS at 13:25

## 2024-03-19 SDOH — HEALTH STABILITY: MENTAL HEALTH: CURRENT SMOKER: 0

## 2024-03-19 ASSESSMENT — PAIN SCALES - GENERAL
PAINLEVEL_OUTOF10: 0 - NO PAIN
PAIN_LEVEL: 3
PAINLEVEL_OUTOF10: 0 - NO PAIN

## 2024-03-19 ASSESSMENT — PAIN - FUNCTIONAL ASSESSMENT
PAIN_FUNCTIONAL_ASSESSMENT: 0-10

## 2024-03-19 ASSESSMENT — COLUMBIA-SUICIDE SEVERITY RATING SCALE - C-SSRS
6. HAVE YOU EVER DONE ANYTHING, STARTED TO DO ANYTHING, OR PREPARED TO DO ANYTHING TO END YOUR LIFE?: NO
1. IN THE PAST MONTH, HAVE YOU WISHED YOU WERE DEAD OR WISHED YOU COULD GO TO SLEEP AND NOT WAKE UP?: NO
2. HAVE YOU ACTUALLY HAD ANY THOUGHTS OF KILLING YOURSELF?: NO

## 2024-03-19 NOTE — ANESTHESIA POSTPROCEDURE EVALUATION
Patient: Tj Miranda    Procedure Summary       Date: 03/19/24 Room / Location: [facility] OR 01 / Virtual [facility] OR    Anesthesia Start: 1309 Anesthesia Stop: 1355    Procedures:       Cystoscopy with Left Flexible ureteroscopy Left RPG Lithotripsy Laser and possible stent removal (Left)      Cystoscopy with Retrograde Pyelogram (Left) Diagnosis:       Kidney stone      (Kidney stone [N20.0])    Surgeons: Andrew Chacon MD Responsible Provider: Danilo Stewart MD    Anesthesia Type: general ASA Status: 2            Anesthesia Type: general    Vitals Value Taken Time   /75 03/19/24 1411   Temp 36.7 °C (98 °F) 03/19/24 1410   Pulse 79 03/19/24 1413   Resp 13 03/19/24 1413   SpO2 99 % 03/19/24 1413   Vitals shown include unvalidated device data.    Anesthesia Post Evaluation    Patient location during evaluation: PACU  Patient participation: complete - patient participated  Level of consciousness: awake and alert  Pain score: 3  Pain management: adequate  Multimodal analgesia pain management approach  Airway patency: patent  Cardiovascular status: acceptable  Respiratory status: acceptable  Hydration status: acceptable  Postoperative Nausea and Vomiting: none        There were no known notable events for this encounter.

## 2024-03-19 NOTE — ANESTHESIA PREPROCEDURE EVALUATION
Patient: Tj Miranda    Procedure Information       Date/Time: 03/19/24 1300    Procedures:       Cystoscopy with Left Flexible ureteroscopy Left RPG Lithotripsy Laser and possible stent removal (Left)      Cystoscopy with Retrograde Pyelogram (Left)    Location: [facility] OR 01 / Virtual [facility] OR    Surgeons: Andrew Chacon MD            Relevant Problems   Anesthesia (within normal limits)   (-) PONV (postoperative nausea and vomiting)      Cardiovascular (within normal limits)      Endocrine (within normal limits)      GI (within normal limits)      /Renal   (+) Cirrhosis (CMS/HCC)   (+) Kidney stone   (+) Kidney stones   (+) Renal cyst   (+) Renal hematoma      Neuro/Psych (within normal limits)      Pulmonary (within normal limits)      GI/Hepatic   (+) Cirrhosis (CMS/HCC)      Hematology (within normal limits)      Musculoskeletal (within normal limits)      Eyes, Ears, Nose, and Throat (within normal limits)      Infectious Disease (within normal limits)       Clinical information reviewed:   Tobacco  Allergies  Meds   Med Hx  Surg Hx   Fam Hx          NPO Detail:  No data recorded     Physical Exam    Airway  Mallampati: II  TM distance: >3 FB  Neck ROM: full     Cardiovascular   Rhythm: regular  Rate: normal     Dental - normal exam     Pulmonary   Breath sounds clear to auscultation     Abdominal            Anesthesia Plan    History of general anesthesia?: yes  History of complications of general anesthesia?: no    ASA 2     general     The patient is not a current smoker.    intravenous induction   Anesthetic plan and risks discussed with patient.

## 2024-03-19 NOTE — ANESTHESIA PROCEDURE NOTES
Airway  Date/Time: 3/19/2024 1:14 PM  Urgency: elective    Airway not difficult    Staffing  Performed: attending   Authorized by: Danilo Stewart MD    Performed by: Danilo Stewart MD  Patient location during procedure: OR    Indications and Patient Condition  Indications for airway management: anesthesia  Spontaneous ventilation: present  Sedation level: deep  Preoxygenated: yes  Patient position: sniffing  Mask difficulty assessment: 1 - vent by mask    Final Airway Details  Final airway type: supraglottic airway      Successful airway: Supreme  Size 5     Number of attempts at approach: 1  Number of other approaches attempted: 0

## 2024-03-19 NOTE — OP NOTE
Cystoscopy with Left Flexible ureteroscopy Left RPG Lithotripsy Laser and possible stent removal (L), Cystoscopy with Retrograde Pyelogram (L) Operative Note     Date: 3/19/2024  OR Location: [facility] OR    Name: Tj Miranda, : 1965, Age: 58 y.o., MRN: [id number], Sex: male    Diagnosis  Pre-op Diagnosis     * Kidney stone [N20.0] Post-op Diagnosis     * Kidney stone [N20.0]     Procedures  Cystoscopy with Left Flexible ureteroscopy Left RPG Lithotripsy Laser and possible stent removal  47264 - [place] CYSTO/URETERO W/LITHOTRIPSY &INDWELL STENT INSRT    Cystoscopy with Retrograde Pyelogram  62251 - CHG UROGRAPHY RETROGRADE WITH/WO KUB      Surgeons      * Andrew Chacon - Primary    Resident/Fellow/Other Assistant:  Surgeon(s) and Role:    Procedure Summary  Anesthesia: General  ASA: II  Anesthesia Staff: Anesthesiologist: Danilo Stewart MD  Estimated Blood Loss: 0mL  Intra-op Medications:   Administrations occurring from 1300 to 1330 on 24:   Medication Name Total Dose   iohexol (OMNIPaque) 300 mg iodine/mL solution 22 mL              Anesthesia Record               Intraprocedure I/O Totals       None           Specimen: No specimens collected     Staff:   Circulator: Janis Thornton RN  Scrub Person: Tammy Ludwig RN; Freedom Malik  X-Ray Technologist: Dora Colunga                 Indications: Tj Miranda is an 58 y.o. male who is having surgery for Kidney stone [N20.0].     The patient was seen in the preoperative area. The risks, benefits, complications, treatment options, non-operative alternatives, expected recovery and outcomes were discussed with the patient. The possibilities of reaction to medication, pulmonary aspiration, injury to surrounding structures, bleeding, recurrent infection, the need for additional procedures, failure to diagnose a condition, and creating a complication requiring transfusion or operation were discussed with the patient. The patient concurred with the proposed plan,  giving informed consent.  The site of surgery was properly noted/marked if necessary per policy. The patient has been actively warmed in preoperative area.     Preoperative diagnosis: Left ureteral stone  Postoperative diagnosis: The same  Physician: Oswaldo  Procedure: Cystoscopy with Left RPG, Left ureteroscopy with holmium and stent removal  ESTIMATED BLOOD LOSS: Minimal.    COMPLICATIONS: None.    INDICATIONS AND CONSENT:  After the risks, benefits, alternatives and indications of this procedure were explained to the patient consented.    PROCEDURE:   The patient was brought to the operating room, placed on the table in supine position.  After adequate anesthesia was obtained, the patient was prepped and draped in the standard surgical fashion.  First, a 21 [place] cystoscope was inserted into the bladder, and formal cystoscopy was performed. A guidewire was placed up the ureter into the renal pelvis using fluoroscopic guidance . A left retrograde pyelogram suggested a filling defect in the lower pole. The flexible ureteroscope was taken up to the level of the stone. The stone was visualized and the laser was used to break up the stone.  we then shot a retrograde pyelogram which did not show any obvious filling defects or additional stones in the ureter.    The ureteroscope was removed . A new stent was not placed.  The patient tolerated the procedure well and there were no complications.   Attending Attestation: I was present and scrubbed for the entire procedure.    Andrew Chacon  Phone Number: [phone]

## 2024-05-01 ENCOUNTER — APPOINTMENT (OUTPATIENT)
Dept: UROLOGY | Facility: CLINIC | Age: 59
End: 2024-05-01
Payer: COMMERCIAL

## (undated) DEVICE — SOLUTION, PREP, PVP IODINE, FLIP TOP, 4OZ

## (undated) DEVICE — DRESSING, GAUZE, SPONGE, 12 PLY, CURITY, 4 X 4 IN, RIGID TRAY, STERILE

## (undated) DEVICE — LINE, GAS SAMPLING, .05IN 1D 10FT, M/M CONNECTORS

## (undated) DEVICE — CATHETER, URETERAL 5F, OPEN END

## (undated) DEVICE — MASK, FACE, ANESTHESIA, ADULT, LARGE, P6, RED

## (undated) DEVICE — STRAP, ARMBOARD, 3IN, BLUE/WHITE, SECURE HOOK

## (undated) DEVICE — GLOVE, PROTEXIS PI CLASSIC, SZ-8.0, PF, PF, LF

## (undated) DEVICE — GUIDEWIRE, DUAL SENSOR, .035 X 150 STRAIGHT,  3CM

## (undated) DEVICE — SOLUTION, IRRIGATION, STERILE WATER, 1000 ML, POUR BOTTLE

## (undated) DEVICE — BULB, IRRIGATOR, PATHFINDER PLUS, 10IN

## (undated) DEVICE — CIRCUIT, BREATHING, ADULT, B/V FILTER, HMEF, 3 L BAG, 108 IN

## (undated) DEVICE — SOLUTION, IRRIGATION, USP, SODIUM CHLORIDE 0.9%, 3000 ML, BAG

## (undated) DEVICE — Device

## (undated) DEVICE — MAT, QUICK WICK, FLUID ABSORPTION, 40X30

## (undated) DEVICE — STRAP, KNEE AND BODY, SINGLE USE

## (undated) DEVICE — FIBER, MOSES 200 DFL